# Patient Record
Sex: FEMALE | Race: BLACK OR AFRICAN AMERICAN | Employment: OTHER
[De-identification: names, ages, dates, MRNs, and addresses within clinical notes are randomized per-mention and may not be internally consistent; named-entity substitution may affect disease eponyms.]

---

## 2017-01-23 ENCOUNTER — OFFICE VISIT (OUTPATIENT)
Dept: ORTHOPEDIC SURGERY | Facility: CLINIC | Age: 62
End: 2017-01-23

## 2017-01-23 VITALS — HEIGHT: 58 IN | BODY MASS INDEX: 54.03 KG/M2 | WEIGHT: 257.4 LBS

## 2017-01-23 DIAGNOSIS — M17.0 PRIMARY OSTEOARTHRITIS OF BOTH KNEES: Primary | ICD-10-CM

## 2017-01-23 PROCEDURE — 99213 OFFICE O/P EST LOW 20 MIN: CPT | Performed by: ORTHOPAEDIC SURGERY

## 2017-01-23 RX ORDER — MELOXICAM 15 MG/1
15 TABLET ORAL DAILY
Qty: 30 TABLET | Refills: 3 | Status: SHIPPED | OUTPATIENT
Start: 2017-01-23 | End: 2017-03-06 | Stop reason: SDUPTHER

## 2017-01-23 ASSESSMENT — ENCOUNTER SYMPTOMS
WHEEZING: 0
NAUSEA: 0
CHOKING: 0
EYE DISCHARGE: 0
VOMITING: 0
CHEST TIGHTNESS: 0
DIARRHEA: 0
ABDOMINAL PAIN: 0

## 2017-03-06 ENCOUNTER — HOSPITAL ENCOUNTER (OUTPATIENT)
Age: 62
Setting detail: SPECIMEN
Discharge: HOME | End: 2017-03-06
Payer: MEDICARE

## 2017-03-06 ENCOUNTER — OFFICE VISIT (OUTPATIENT)
Dept: INTERNAL MEDICINE | Facility: CLINIC | Age: 62
End: 2017-03-06

## 2017-03-06 VITALS
HEART RATE: 83 BPM | DIASTOLIC BLOOD PRESSURE: 73 MMHG | SYSTOLIC BLOOD PRESSURE: 127 MMHG | HEIGHT: 58 IN | BODY MASS INDEX: 52.9 KG/M2 | WEIGHT: 252 LBS

## 2017-03-06 DIAGNOSIS — E11.9 TYPE 2 DIABETES MELLITUS WITHOUT COMPLICATION, WITHOUT LONG-TERM CURRENT USE OF INSULIN (HCC): Chronic | ICD-10-CM

## 2017-03-06 DIAGNOSIS — M17.0 PRIMARY OSTEOARTHRITIS OF BOTH KNEES: ICD-10-CM

## 2017-03-06 DIAGNOSIS — E11.9 TYPE 2 DIABETES MELLITUS WITHOUT COMPLICATION, WITHOUT LONG-TERM CURRENT USE OF INSULIN (HCC): Primary | Chronic | ICD-10-CM

## 2017-03-06 DIAGNOSIS — K21.9 GASTROESOPHAGEAL REFLUX DISEASE, ESOPHAGITIS PRESENCE NOT SPECIFIED: ICD-10-CM

## 2017-03-06 DIAGNOSIS — I10 ESSENTIAL HYPERTENSION: ICD-10-CM

## 2017-03-06 DIAGNOSIS — Z11.4 SCREENING FOR HIV (HUMAN IMMUNODEFICIENCY VIRUS): ICD-10-CM

## 2017-03-06 DIAGNOSIS — Z11.59 NEED FOR HEPATITIS C SCREENING TEST: ICD-10-CM

## 2017-03-06 DIAGNOSIS — Z12.31 ENCOUNTER FOR SCREENING MAMMOGRAM FOR MALIGNANT NEOPLASM OF BREAST: ICD-10-CM

## 2017-03-06 LAB
CHOLESTEROL/HDL RATIO: 2.4
CHOLESTEROL: 129 MG/DL
HBA1C MFR BLD: 6.1 %
HDLC SERPL-MCNC: 54 MG/DL
HEPATITIS C ANTIBODY: NONREACTIVE
HIV AG/AB: NONREACTIVE
LDL CHOLESTEROL: 56 MG/DL (ref 0–130)
TRIGL SERPL-MCNC: 93 MG/DL
VLDLC SERPL CALC-MCNC: NORMAL MG/DL (ref 1–30)

## 2017-03-06 PROCEDURE — 83036 HEMOGLOBIN GLYCOSYLATED A1C: CPT | Performed by: INTERNAL MEDICINE

## 2017-03-06 PROCEDURE — 87389 HIV-1 AG W/HIV-1&-2 AB AG IA: CPT

## 2017-03-06 PROCEDURE — 86803 HEPATITIS C AB TEST: CPT

## 2017-03-06 PROCEDURE — 99214 OFFICE O/P EST MOD 30 MIN: CPT | Performed by: INTERNAL MEDICINE

## 2017-03-06 PROCEDURE — 36415 COLL VENOUS BLD VENIPUNCTURE: CPT

## 2017-03-06 PROCEDURE — 80061 LIPID PANEL: CPT

## 2017-03-06 RX ORDER — MELOXICAM 15 MG/1
15 TABLET ORAL DAILY
Qty: 30 TABLET | Refills: 3 | Status: SHIPPED | OUTPATIENT
Start: 2017-03-06 | End: 2017-05-08 | Stop reason: SDUPTHER

## 2017-03-06 RX ORDER — ATORVASTATIN CALCIUM 20 MG/1
TABLET, FILM COATED ORAL
Qty: 90 TABLET | Refills: 5 | Status: SHIPPED | OUTPATIENT
Start: 2017-03-06 | End: 2017-06-07 | Stop reason: SDUPTHER

## 2017-03-06 RX ORDER — LORATADINE 10 MG/1
10 TABLET ORAL DAILY
Qty: 90 TABLET | Refills: 3 | Status: SHIPPED | OUTPATIENT
Start: 2017-03-06 | End: 2017-06-07 | Stop reason: SDUPTHER

## 2017-03-06 RX ORDER — LISINOPRIL 30 MG/1
30 TABLET ORAL DAILY
Qty: 90 TABLET | Refills: 5 | Status: SHIPPED | OUTPATIENT
Start: 2017-03-06 | End: 2017-06-07 | Stop reason: SDUPTHER

## 2017-03-06 RX ORDER — OMEPRAZOLE 20 MG/1
20 CAPSULE, DELAYED RELEASE ORAL DAILY
Qty: 90 CAPSULE | Refills: 3 | Status: SHIPPED | OUTPATIENT
Start: 2017-03-06 | End: 2017-06-07 | Stop reason: SDUPTHER

## 2017-03-08 ENCOUNTER — OFFICE VISIT (OUTPATIENT)
Dept: PODIATRY | Facility: CLINIC | Age: 62
End: 2017-03-08

## 2017-03-08 VITALS
BODY MASS INDEX: 53.74 KG/M2 | DIASTOLIC BLOOD PRESSURE: 79 MMHG | HEIGHT: 58 IN | WEIGHT: 256 LBS | SYSTOLIC BLOOD PRESSURE: 149 MMHG | TEMPERATURE: 98.2 F | HEART RATE: 102 BPM

## 2017-03-08 DIAGNOSIS — B35.1 ONYCHOMYCOSIS: Primary | ICD-10-CM

## 2017-03-08 DIAGNOSIS — R60.0 BILATERAL EDEMA OF LOWER EXTREMITY: ICD-10-CM

## 2017-03-08 DIAGNOSIS — E11.42 CONTROLLED TYPE 2 DIABETES MELLITUS WITH DIABETIC POLYNEUROPATHY, WITHOUT LONG-TERM CURRENT USE OF INSULIN (HCC): ICD-10-CM

## 2017-03-08 DIAGNOSIS — M79.675 PAIN OF TOES OF BOTH FEET: ICD-10-CM

## 2017-03-08 DIAGNOSIS — M79.674 PAIN OF TOES OF BOTH FEET: ICD-10-CM

## 2017-03-08 PROCEDURE — 11721 DEBRIDE NAIL 6 OR MORE: CPT | Performed by: PODIATRIST

## 2017-04-17 DIAGNOSIS — M17.0 OSTEOARTHRITIS OF BOTH KNEES, UNSPECIFIED OSTEOARTHRITIS TYPE: ICD-10-CM

## 2017-04-17 RX ORDER — TRAMADOL HYDROCHLORIDE 50 MG/1
50 TABLET ORAL 2 TIMES DAILY PRN
Qty: 60 TABLET | Refills: 0 | OUTPATIENT
Start: 2017-04-17 | End: 2017-06-07 | Stop reason: SDUPTHER

## 2017-05-08 ENCOUNTER — OFFICE VISIT (OUTPATIENT)
Dept: ORTHOPEDIC SURGERY | Age: 62
End: 2017-05-08
Payer: MEDICARE

## 2017-05-08 VITALS — HEIGHT: 58 IN | WEIGHT: 254 LBS | BODY MASS INDEX: 53.32 KG/M2

## 2017-05-08 DIAGNOSIS — M17.0 PRIMARY OSTEOARTHRITIS OF BOTH KNEES: Primary | ICD-10-CM

## 2017-05-08 PROCEDURE — 99213 OFFICE O/P EST LOW 20 MIN: CPT | Performed by: ORTHOPAEDIC SURGERY

## 2017-05-08 RX ORDER — MELOXICAM 15 MG/1
15 TABLET ORAL DAILY
Qty: 30 TABLET | Refills: 3 | Status: SHIPPED | OUTPATIENT
Start: 2017-05-08 | End: 2017-06-07 | Stop reason: SDUPTHER

## 2017-05-08 ASSESSMENT — ENCOUNTER SYMPTOMS
DIARRHEA: 0
VOMITING: 0
CHOKING: 0
NAUSEA: 0
WHEEZING: 0
EYE DISCHARGE: 0
CHEST TIGHTNESS: 0
ABDOMINAL PAIN: 0

## 2017-05-30 ENCOUNTER — HOSPITAL ENCOUNTER (OUTPATIENT)
Dept: PHYSICAL THERAPY | Facility: CLINIC | Age: 62
Setting detail: THERAPIES SERIES
Discharge: HOME | End: 2017-05-30
Payer: MEDICARE

## 2017-05-30 PROCEDURE — G8978 MOBILITY CURRENT STATUS: HCPCS

## 2017-05-30 PROCEDURE — G8979 MOBILITY GOAL STATUS: HCPCS

## 2017-05-30 PROCEDURE — 97110 THERAPEUTIC EXERCISES: CPT

## 2017-05-30 PROCEDURE — 97161 PT EVAL LOW COMPLEX 20 MIN: CPT

## 2017-06-01 ENCOUNTER — HOSPITAL ENCOUNTER (OUTPATIENT)
Dept: PHYSICAL THERAPY | Facility: CLINIC | Age: 62
Setting detail: THERAPIES SERIES
Discharge: HOME | End: 2017-06-01
Payer: MEDICARE

## 2017-06-06 ENCOUNTER — HOSPITAL ENCOUNTER (OUTPATIENT)
Dept: PHYSICAL THERAPY | Facility: CLINIC | Age: 62
Setting detail: THERAPIES SERIES
Discharge: HOME | End: 2017-06-06
Payer: MEDICARE

## 2017-06-06 PROCEDURE — 97113 AQUATIC THERAPY/EXERCISES: CPT

## 2017-06-07 ENCOUNTER — OFFICE VISIT (OUTPATIENT)
Dept: INTERNAL MEDICINE | Age: 62
End: 2017-06-07
Payer: MEDICARE

## 2017-06-07 VITALS
HEART RATE: 76 BPM | HEIGHT: 58 IN | DIASTOLIC BLOOD PRESSURE: 79 MMHG | WEIGHT: 254 LBS | BODY MASS INDEX: 53.32 KG/M2 | SYSTOLIC BLOOD PRESSURE: 133 MMHG

## 2017-06-07 DIAGNOSIS — L85.3 XEROSIS OF SKIN: ICD-10-CM

## 2017-06-07 DIAGNOSIS — M17.0 OSTEOARTHRITIS OF BOTH KNEES, UNSPECIFIED OSTEOARTHRITIS TYPE: ICD-10-CM

## 2017-06-07 DIAGNOSIS — M17.0 PRIMARY OSTEOARTHRITIS OF BOTH KNEES: ICD-10-CM

## 2017-06-07 DIAGNOSIS — I10 ESSENTIAL HYPERTENSION: ICD-10-CM

## 2017-06-07 DIAGNOSIS — K21.9 GASTROESOPHAGEAL REFLUX DISEASE, ESOPHAGITIS PRESENCE NOT SPECIFIED: ICD-10-CM

## 2017-06-07 PROCEDURE — 99213 OFFICE O/P EST LOW 20 MIN: CPT | Performed by: INTERNAL MEDICINE

## 2017-06-07 PROCEDURE — 96160 PT-FOCUSED HLTH RISK ASSMT: CPT | Performed by: INTERNAL MEDICINE

## 2017-06-07 RX ORDER — LORATADINE 10 MG/1
10 TABLET ORAL DAILY
Qty: 90 TABLET | Refills: 3 | Status: SHIPPED | OUTPATIENT
Start: 2017-06-07 | End: 2017-10-11 | Stop reason: SDUPTHER

## 2017-06-07 RX ORDER — FLUTICASONE PROPIONATE 50 MCG
1 SPRAY, SUSPENSION (ML) NASAL DAILY
Qty: 3 BOTTLE | Refills: 3 | Status: SHIPPED | OUTPATIENT
Start: 2017-06-07 | End: 2017-10-11 | Stop reason: SDUPTHER

## 2017-06-07 RX ORDER — EMOLLIENT COMBINATION NO.110
1 LOTION (ML) TOPICAL DAILY
Qty: 1 BOTTLE | Refills: 3 | Status: SHIPPED | OUTPATIENT
Start: 2017-06-07 | End: 2017-10-11 | Stop reason: SDUPTHER

## 2017-06-07 RX ORDER — LISINOPRIL 30 MG/1
30 TABLET ORAL DAILY
Qty: 90 TABLET | Refills: 5 | Status: SHIPPED | OUTPATIENT
Start: 2017-06-07 | End: 2017-10-11 | Stop reason: SDUPTHER

## 2017-06-07 RX ORDER — TRAMADOL HYDROCHLORIDE 50 MG/1
50 TABLET ORAL 2 TIMES DAILY PRN
Qty: 60 TABLET | Refills: 0 | Status: SHIPPED | OUTPATIENT
Start: 2017-06-07 | End: 2017-08-17 | Stop reason: SDUPTHER

## 2017-06-07 RX ORDER — OMEPRAZOLE 20 MG/1
20 CAPSULE, DELAYED RELEASE ORAL DAILY
Qty: 90 CAPSULE | Refills: 3 | Status: SHIPPED | OUTPATIENT
Start: 2017-06-07 | End: 2017-10-11 | Stop reason: SDUPTHER

## 2017-06-07 RX ORDER — MELOXICAM 15 MG/1
15 TABLET ORAL DAILY
Qty: 30 TABLET | Refills: 3 | Status: SHIPPED | OUTPATIENT
Start: 2017-06-07 | End: 2017-08-07 | Stop reason: SDUPTHER

## 2017-06-07 RX ORDER — CLOBETASOL PROPIONATE 0.5 MG/G
OINTMENT TOPICAL
Qty: 1 TUBE | Refills: 3 | Status: SHIPPED | OUTPATIENT
Start: 2017-06-07 | End: 2017-10-11

## 2017-06-07 RX ORDER — ATORVASTATIN CALCIUM 20 MG/1
TABLET, FILM COATED ORAL
Qty: 90 TABLET | Refills: 5 | Status: SHIPPED | OUTPATIENT
Start: 2017-06-07 | End: 2018-08-31 | Stop reason: SDUPTHER

## 2017-06-07 ASSESSMENT — PATIENT HEALTH QUESTIONNAIRE - PHQ9
3. TROUBLE FALLING OR STAYING ASLEEP: 1
6. FEELING BAD ABOUT YOURSELF - OR THAT YOU ARE A FAILURE OR HAVE LET YOURSELF OR YOUR FAMILY DOWN: 0
9. THOUGHTS THAT YOU WOULD BE BETTER OFF DEAD, OR OF HURTING YOURSELF: 0
7. TROUBLE CONCENTRATING ON THINGS, SUCH AS READING THE NEWSPAPER OR WATCHING TELEVISION: 0
SUM OF ALL RESPONSES TO PHQ QUESTIONS 1-9: 2
10. IF YOU CHECKED OFF ANY PROBLEMS, HOW DIFFICULT HAVE THESE PROBLEMS MADE IT FOR YOU TO DO YOUR WORK, TAKE CARE OF THINGS AT HOME, OR GET ALONG WITH OTHER PEOPLE: 0
4. FEELING TIRED OR HAVING LITTLE ENERGY: 1
SUM OF ALL RESPONSES TO PHQ9 QUESTIONS 1 & 2: 0
8. MOVING OR SPEAKING SO SLOWLY THAT OTHER PEOPLE COULD HAVE NOTICED. OR THE OPPOSITE, BEING SO FIGETY OR RESTLESS THAT YOU HAVE BEEN MOVING AROUND A LOT MORE THAN USUAL: 0
1. LITTLE INTEREST OR PLEASURE IN DOING THINGS: 0
5. POOR APPETITE OR OVEREATING: 0
2. FEELING DOWN, DEPRESSED OR HOPELESS: 0

## 2017-06-08 ENCOUNTER — HOSPITAL ENCOUNTER (OUTPATIENT)
Dept: PHYSICAL THERAPY | Facility: CLINIC | Age: 62
Setting detail: THERAPIES SERIES
Discharge: HOME | End: 2017-06-08
Payer: MEDICARE

## 2017-06-08 PROCEDURE — 97113 AQUATIC THERAPY/EXERCISES: CPT

## 2017-06-13 ENCOUNTER — HOSPITAL ENCOUNTER (OUTPATIENT)
Dept: PHYSICAL THERAPY | Facility: CLINIC | Age: 62
Setting detail: THERAPIES SERIES
Discharge: HOME | End: 2017-06-13
Payer: MEDICARE

## 2017-06-13 PROCEDURE — 97113 AQUATIC THERAPY/EXERCISES: CPT

## 2017-06-14 ENCOUNTER — PROCEDURE VISIT (OUTPATIENT)
Dept: PODIATRY | Age: 62
End: 2017-06-14
Payer: MEDICARE

## 2017-06-14 ENCOUNTER — HOSPITAL ENCOUNTER (OUTPATIENT)
Age: 62
Discharge: HOME | End: 2017-06-14
Payer: MEDICARE

## 2017-06-14 ENCOUNTER — HOSPITAL ENCOUNTER (OUTPATIENT)
Dept: GENERAL RADIOLOGY | Age: 62
Discharge: HOME | End: 2017-06-14
Payer: MEDICARE

## 2017-06-14 VITALS
WEIGHT: 253 LBS | SYSTOLIC BLOOD PRESSURE: 115 MMHG | HEART RATE: 76 BPM | HEIGHT: 58 IN | DIASTOLIC BLOOD PRESSURE: 87 MMHG | TEMPERATURE: 98.1 F | BODY MASS INDEX: 53.11 KG/M2

## 2017-06-14 DIAGNOSIS — B35.3 TINEA PEDIS OF BOTH FEET: ICD-10-CM

## 2017-06-14 DIAGNOSIS — B35.1 ONYCHOMYCOSIS: Primary | ICD-10-CM

## 2017-06-14 DIAGNOSIS — E11.42 CONTROLLED TYPE 2 DIABETES MELLITUS WITH DIABETIC POLYNEUROPATHY, WITHOUT LONG-TERM CURRENT USE OF INSULIN (HCC): ICD-10-CM

## 2017-06-14 DIAGNOSIS — M79.674 PAIN OF TOES OF BOTH FEET: ICD-10-CM

## 2017-06-14 DIAGNOSIS — R60.0 BILATERAL EDEMA OF LOWER EXTREMITY: ICD-10-CM

## 2017-06-14 DIAGNOSIS — M25.572 SINUS TARSI SYNDROME, LEFT: ICD-10-CM

## 2017-06-14 DIAGNOSIS — M79.675 PAIN OF TOES OF BOTH FEET: ICD-10-CM

## 2017-06-14 PROCEDURE — 73610 X-RAY EXAM OF ANKLE: CPT

## 2017-06-14 PROCEDURE — 99213 OFFICE O/P EST LOW 20 MIN: CPT | Performed by: PODIATRIST

## 2017-06-14 PROCEDURE — 11721 DEBRIDE NAIL 6 OR MORE: CPT | Performed by: PODIATRIST

## 2017-06-14 RX ORDER — CLOTRIMAZOLE AND BETAMETHASONE DIPROPIONATE 10; .64 MG/G; MG/G
CREAM TOPICAL
Qty: 1 TUBE | Refills: 3 | Status: SHIPPED | OUTPATIENT
Start: 2017-06-14 | End: 2018-12-13 | Stop reason: SDUPTHER

## 2017-06-14 RX ORDER — CLOTRIMAZOLE AND BETAMETHASONE DIPROPIONATE 10; .64 MG/G; MG/G
CREAM TOPICAL
Qty: 1 TUBE | Refills: 3 | Status: SHIPPED | OUTPATIENT
Start: 2017-06-14 | End: 2017-06-14 | Stop reason: CLARIF

## 2017-06-15 ENCOUNTER — HOSPITAL ENCOUNTER (OUTPATIENT)
Dept: PHYSICAL THERAPY | Facility: CLINIC | Age: 62
Setting detail: THERAPIES SERIES
Discharge: HOME | End: 2017-06-15
Payer: MEDICARE

## 2017-06-15 PROCEDURE — 97113 AQUATIC THERAPY/EXERCISES: CPT

## 2017-06-20 ENCOUNTER — HOSPITAL ENCOUNTER (OUTPATIENT)
Dept: PHYSICAL THERAPY | Facility: CLINIC | Age: 62
Setting detail: THERAPIES SERIES
Discharge: HOME | End: 2017-06-20
Payer: MEDICARE

## 2017-06-20 ENCOUNTER — TELEPHONE (OUTPATIENT)
Dept: INTERNAL MEDICINE | Age: 62
End: 2017-06-20

## 2017-06-22 ENCOUNTER — HOSPITAL ENCOUNTER (OUTPATIENT)
Dept: PHYSICAL THERAPY | Facility: CLINIC | Age: 62
Setting detail: THERAPIES SERIES
Discharge: HOME | End: 2017-06-22
Payer: MEDICARE

## 2017-06-22 PROCEDURE — 97113 AQUATIC THERAPY/EXERCISES: CPT

## 2017-06-27 ENCOUNTER — HOSPITAL ENCOUNTER (OUTPATIENT)
Dept: PHYSICAL THERAPY | Facility: CLINIC | Age: 62
Setting detail: THERAPIES SERIES
Discharge: HOME | End: 2017-06-27
Payer: MEDICARE

## 2017-06-27 PROCEDURE — 97113 AQUATIC THERAPY/EXERCISES: CPT

## 2017-06-29 ENCOUNTER — HOSPITAL ENCOUNTER (OUTPATIENT)
Dept: PHYSICAL THERAPY | Facility: CLINIC | Age: 62
Setting detail: THERAPIES SERIES
Discharge: HOME | End: 2017-06-29
Payer: MEDICARE

## 2017-06-29 DIAGNOSIS — Z12.11 SCREENING FOR COLORECTAL CANCER: Primary | ICD-10-CM

## 2017-06-29 DIAGNOSIS — Z12.12 SCREENING FOR COLORECTAL CANCER: Primary | ICD-10-CM

## 2017-06-29 LAB
CONTROL: NORMAL
HEMOCCULT STL QL: NEGATIVE

## 2017-06-29 PROCEDURE — 82274 ASSAY TEST FOR BLOOD FECAL: CPT | Performed by: INTERNAL MEDICINE

## 2017-08-07 ENCOUNTER — OFFICE VISIT (OUTPATIENT)
Dept: ORTHOPEDIC SURGERY | Age: 62
End: 2017-08-07
Payer: MEDICARE

## 2017-08-07 VITALS — HEIGHT: 59 IN | WEIGHT: 257.8 LBS | BODY MASS INDEX: 51.97 KG/M2

## 2017-08-07 DIAGNOSIS — M17.11 ARTHRITIS OF RIGHT KNEE: Primary | ICD-10-CM

## 2017-08-07 DIAGNOSIS — M17.12 ARTHRITIS OF LEFT KNEE: ICD-10-CM

## 2017-08-07 DIAGNOSIS — M17.0 PRIMARY OSTEOARTHRITIS OF BOTH KNEES: ICD-10-CM

## 2017-08-07 PROCEDURE — 99213 OFFICE O/P EST LOW 20 MIN: CPT | Performed by: ORTHOPAEDIC SURGERY

## 2017-08-07 RX ORDER — MELOXICAM 15 MG/1
15 TABLET ORAL DAILY
Qty: 30 TABLET | Refills: 3 | Status: SHIPPED | OUTPATIENT
Start: 2017-08-07 | End: 2017-10-24 | Stop reason: SDUPTHER

## 2017-08-17 DIAGNOSIS — M17.0 OSTEOARTHRITIS OF BOTH KNEES, UNSPECIFIED OSTEOARTHRITIS TYPE: ICD-10-CM

## 2017-08-17 RX ORDER — TRAMADOL HYDROCHLORIDE 50 MG/1
50 TABLET ORAL 2 TIMES DAILY PRN
Qty: 60 TABLET | Refills: 0 | Status: SHIPPED | OUTPATIENT
Start: 2017-08-17 | End: 2017-10-11 | Stop reason: SDUPTHER

## 2017-08-23 ENCOUNTER — OFFICE VISIT (OUTPATIENT)
Dept: PODIATRY | Age: 62
End: 2017-08-23
Payer: MEDICARE

## 2017-08-23 VITALS
BODY MASS INDEX: 52.41 KG/M2 | WEIGHT: 260 LBS | TEMPERATURE: 97.7 F | HEIGHT: 59 IN | HEART RATE: 90 BPM | SYSTOLIC BLOOD PRESSURE: 130 MMHG | DIASTOLIC BLOOD PRESSURE: 65 MMHG

## 2017-08-23 DIAGNOSIS — R60.0 BILATERAL EDEMA OF LOWER EXTREMITY: ICD-10-CM

## 2017-08-23 DIAGNOSIS — M25.572 SINUS TARSI SYNDROME, LEFT: ICD-10-CM

## 2017-08-23 DIAGNOSIS — M19.079 ARTHRITIS OF ANKLE: Primary | ICD-10-CM

## 2017-08-23 PROCEDURE — 99213 OFFICE O/P EST LOW 20 MIN: CPT | Performed by: PODIATRIST

## 2017-10-05 ENCOUNTER — HOSPITAL ENCOUNTER (OUTPATIENT)
Dept: PHYSICAL THERAPY | Facility: CLINIC | Age: 62
Setting detail: THERAPIES SERIES
Discharge: HOME | End: 2017-10-05
Payer: MEDICARE

## 2017-10-11 ENCOUNTER — OFFICE VISIT (OUTPATIENT)
Dept: INTERNAL MEDICINE | Age: 62
End: 2017-10-11
Payer: MEDICARE

## 2017-10-11 VITALS
HEIGHT: 58 IN | WEIGHT: 265.4 LBS | DIASTOLIC BLOOD PRESSURE: 87 MMHG | BODY MASS INDEX: 55.71 KG/M2 | SYSTOLIC BLOOD PRESSURE: 154 MMHG | HEART RATE: 71 BPM

## 2017-10-11 DIAGNOSIS — Z23 NEEDS FLU SHOT: ICD-10-CM

## 2017-10-11 DIAGNOSIS — K21.9 GASTROESOPHAGEAL REFLUX DISEASE, ESOPHAGITIS PRESENCE NOT SPECIFIED: ICD-10-CM

## 2017-10-11 DIAGNOSIS — E11.9 TYPE 2 DIABETES MELLITUS WITHOUT COMPLICATION, WITHOUT LONG-TERM CURRENT USE OF INSULIN (HCC): Primary | Chronic | ICD-10-CM

## 2017-10-11 DIAGNOSIS — I10 ESSENTIAL HYPERTENSION: ICD-10-CM

## 2017-10-11 DIAGNOSIS — M17.0 OSTEOARTHRITIS OF BOTH KNEES, UNSPECIFIED OSTEOARTHRITIS TYPE: ICD-10-CM

## 2017-10-11 LAB — HBA1C MFR BLD: 6.3 %

## 2017-10-11 PROCEDURE — G0008 ADMIN INFLUENZA VIRUS VAC: HCPCS | Performed by: INTERNAL MEDICINE

## 2017-10-11 PROCEDURE — 90688 IIV4 VACCINE SPLT 0.5 ML IM: CPT | Performed by: INTERNAL MEDICINE

## 2017-10-11 PROCEDURE — 99214 OFFICE O/P EST MOD 30 MIN: CPT | Performed by: INTERNAL MEDICINE

## 2017-10-11 PROCEDURE — 83036 HEMOGLOBIN GLYCOSYLATED A1C: CPT | Performed by: INTERNAL MEDICINE

## 2017-10-11 RX ORDER — LORATADINE 10 MG/1
10 TABLET ORAL DAILY
Qty: 90 TABLET | Refills: 3 | Status: SHIPPED | OUTPATIENT
Start: 2017-10-11 | End: 2018-12-13 | Stop reason: SDUPTHER

## 2017-10-11 RX ORDER — GUAIFENESIN 100 MG/5ML
10 SYRUP ORAL 2 TIMES DAILY PRN
Qty: 1 BOTTLE | Refills: 3 | Status: SHIPPED | OUTPATIENT
Start: 2017-10-11 | End: 2019-05-23 | Stop reason: ALTCHOICE

## 2017-10-11 RX ORDER — BLOOD SUGAR DIAGNOSTIC
STRIP MISCELLANEOUS
Qty: 100 EACH | Refills: 11 | Status: SHIPPED | OUTPATIENT
Start: 2017-10-11

## 2017-10-11 RX ORDER — FLUTICASONE PROPIONATE 50 MCG
1 SPRAY, SUSPENSION (ML) NASAL DAILY
Qty: 3 BOTTLE | Refills: 3 | Status: SHIPPED | OUTPATIENT
Start: 2017-10-11 | End: 2018-12-13 | Stop reason: SDUPTHER

## 2017-10-11 RX ORDER — LISINOPRIL 30 MG/1
30 TABLET ORAL DAILY
Qty: 90 TABLET | Refills: 5 | Status: SHIPPED | OUTPATIENT
Start: 2017-10-11 | End: 2018-12-13 | Stop reason: SDUPTHER

## 2017-10-11 RX ORDER — GLUCOSAMINE HCL/CHONDROITIN SU 500-400 MG
CAPSULE ORAL
Qty: 100 STRIP | Refills: 11 | Status: SHIPPED | OUTPATIENT
Start: 2017-10-11 | End: 2019-05-23 | Stop reason: ALTCHOICE

## 2017-10-11 RX ORDER — TRAMADOL HYDROCHLORIDE 50 MG/1
50 TABLET ORAL 2 TIMES DAILY PRN
Qty: 60 TABLET | Refills: 0 | Status: SHIPPED | OUTPATIENT
Start: 2017-10-11 | End: 2017-12-01 | Stop reason: SDUPTHER

## 2017-10-11 RX ORDER — EMOLLIENT COMBINATION NO.110
1 LOTION (ML) TOPICAL DAILY
Qty: 1 BOTTLE | Refills: 3 | Status: SHIPPED | OUTPATIENT
Start: 2017-10-11 | End: 2019-05-23 | Stop reason: ALTCHOICE

## 2017-10-11 RX ORDER — LANCETS 30 GAUGE
EACH MISCELLANEOUS
Qty: 100 EACH | Refills: 3 | Status: SHIPPED | OUTPATIENT
Start: 2017-10-11

## 2017-10-11 RX ORDER — OMEPRAZOLE 20 MG/1
20 CAPSULE, DELAYED RELEASE ORAL DAILY
Qty: 90 CAPSULE | Refills: 3 | Status: SHIPPED | OUTPATIENT
Start: 2017-10-11 | End: 2018-12-13 | Stop reason: SDUPTHER

## 2017-10-11 NOTE — PROGRESS NOTES
Subjective:      Patient ID: Ramona Burris is a 60 y.o. female. HPI Pt here for annual physical    1. htn-  on lisinopril 30 mg.  BP stable   2. DM -well controlled on metformin. A1C -6.3 today -on Metformin. Follows up with podiatry. 3. OA, c/o knee pain- pain stable on mobic , tramadol. Xray-severe DJD- Following up with ortho. finished Aquatherapy  , ortho has ordered it again  4. C/o urinary incontinence for last few months, every time she coughs or sneezes. Was given kegel exercises in the past those have been helping. She does not want any medications  5. GERD-stable symptoms on Prilosec  6. Only new complaint today is swelling in both lower extremities, chronic, uses compression stockings. No erythema or pain. No history of congestive heart failure. She denies AAMIR symptoms-such as snoring, apnea episodes, unrefreshed sleep, daytime somnolence    Review of Systems   Respiratory: Negative for cough, shortness of breath, wheezing and stridor. Cardiovascular: Negative for chest pain, palpitations and leg swelling. Gastrointestinal: Negative for nausea, vomiting, abdominal pain, diarrhea and constipation. Objective:   Physical Exam   Constitutional: She is oriented to person, place, and time. She appears well-developed. No distress. Cardiovascular: Normal rate and regular rhythm. Pulmonary/Chest: Effort normal and breath sounds normal. No stridor. No respiratory distress. no wheezes. no rales. Abdominal: Soft. Bowel sounds are normal.  no distension. There is no tenderness. There is no rebound and no guarding. Musculoskeletal: bilateral knees-decreased ROM  Minimal ankle swelling present        Assessment:     1. Primary osteoarthritis of both knees    2. Essential hypertension    3. Gastroesophageal reflux disease, esophagitis presence not specified    4. Bilateral LE edema   5. Needs flu shot  6. DM-A1C-6.3   Plan:   1. Tramadol PRN. mobic PRN.  Continue follow up with

## 2017-10-11 NOTE — PATIENT INSTRUCTIONS
Medications e-scribed to pharmacy of patient's choice. Printed Script for Tramadol given to patient. Return To Clinic 1/19/18. After Visit Summary given and reviewed. JF    It is very important for your care that you keep your appointment. If for some reason you are unable to keep your appointment it is equally important that you call our office at [phone] to cancel your appointment and reschedule. Failure to do so may result in your termination from our practice.

## 2017-10-11 NOTE — PROGRESS NOTES
Visit Information    Have you changed or started any medications since your last visit including any over-the-counter medicines, vitamins, or herbal medicines? yes - pain patches,    Have you stopped taking any of your medications? Is so, why? -  no  Are you having any side effects from any of your medications? - no    Have you seen any other physician or provider since your last visit?  no   Have you had any other diagnostic tests since your last visit?  no   Have you been seen in the emergency room and/or had an admission in a hospital since we last saw you?  no   Have you had your routine dental cleaning in the past 6 months?  no     Do you have an active [employer] account? If no, what is the barrier?   Yes    Patient Care Team:  Mikayla Man MD as PCP - General    Medical History Review  Past Medical, Family, and Social History reviewed and does contribute to the patient presenting condition    Health Maintenance   Topic Date Due    Diabetic foot exam  03/23/2017    Diabetic retinal exam  08/20/2017    Flu vaccine (1) 09/01/2017    Diabetic microalbuminuria test  11/16/2017    Breast cancer screen  01/18/2018    Diabetic hemoglobin A1C test  03/06/2018    Lipid screen  03/06/2018    Colon Cancer Screen FIT/FOBT  06/29/2018    DTaP/Tdap/Td vaccine (2 - Td) 07/18/2026    Zostavax vaccine  Addressed    Pneumococcal med risk  Completed    Hepatitis C screen  Completed    HIV screen  Completed

## 2017-10-24 DIAGNOSIS — M17.0 PRIMARY OSTEOARTHRITIS OF BOTH KNEES: ICD-10-CM

## 2017-10-24 RX ORDER — MELOXICAM 15 MG/1
15 TABLET ORAL DAILY
Qty: 30 TABLET | Refills: 3 | Status: SHIPPED | OUTPATIENT
Start: 2017-10-24 | End: 2018-01-19 | Stop reason: SDUPTHER

## 2017-10-24 NOTE — TELEPHONE ENCOUNTER
Health Maintenance   Topic Date Due    Diabetic foot exam  03/23/2017    Diabetic retinal exam  08/20/2017    Diabetic microalbuminuria test  11/16/2017    Breast cancer screen  01/18/2018    Lipid screen  03/06/2018    Colon Cancer Screen FIT/FOBT  06/29/2018    Diabetic hemoglobin A1C test  10/11/2018    DTaP/Tdap/Td vaccine (2 - Td) 07/18/2026    Zostavax vaccine  Addressed    Flu vaccine  Completed    Pneumococcal med risk  Completed    Hepatitis C screen  Completed    HIV screen  Completed             (applicable per patient's age: Cancer Screenings, Depression Screening, Fall Risk Screening, Immunizations)    Hemoglobin A1C (%)   Date Value   10/11/2017 6.3   03/06/2017 6.1   07/18/2016 6.3     Microalb/Crt.  Ratio (mcg/mg creat)   Date Value   11/16/2016 10     LDL Cholesterol (mg/dL)   Date Value   03/06/2017 56     AST (U/L)   Date Value   09/03/2015 15     ALT (U/L)   Date Value   09/03/2015 17     BUN (mg/dL)   Date Value   11/16/2016 11      (goal A1C is < 7)   (goal LDL is <100) need 30-50% reduction from baseline     BP Readings from Last 3 Encounters:   10/11/17 (!) 154/87   08/23/17 130/65   06/14/17 115/87    (goal /80)      All Future Testing planned in [facility]:  Lab Frequency Next Occurrence   ANN Digital Screen Bilateral Once 10/11/2017       Next Visit Date:  Future Appointments  Date Time Provider Jerry Clark   1/19/2018 1:00 PM Aracely Hoang MD [facility] IM [address] [facility]   8/8/2018 2:10 PM SCHEDULE, [facility] [address] [place] [address] Rd            Patient Active Problem List:     Osteoarthritis     Eczema     Obesity     GERD (gastroesophageal reflux disease)     Hypertriglyceridemia     HTN (hypertension)     Hypopigmented skin lesion     Asthma     Allergic rhinitis     Urine, incontinence, stress female     Diabetes mellitus (HCC)     Glaucoma     Primary osteoarthritis of both knees

## 2017-12-01 DIAGNOSIS — M17.0 OSTEOARTHRITIS OF BOTH KNEES, UNSPECIFIED OSTEOARTHRITIS TYPE: ICD-10-CM

## 2017-12-01 RX ORDER — TRAMADOL HYDROCHLORIDE 50 MG/1
TABLET ORAL
Qty: 60 TABLET | Refills: 0 | OUTPATIENT
Start: 2017-12-01 | End: 2018-01-19 | Stop reason: SDUPTHER

## 2018-01-17 ENCOUNTER — OFFICE VISIT (OUTPATIENT)
Dept: PODIATRY | Age: 63
End: 2018-01-17
Payer: MEDICARE

## 2018-01-17 VITALS
DIASTOLIC BLOOD PRESSURE: 81 MMHG | HEART RATE: 83 BPM | HEIGHT: 58 IN | TEMPERATURE: 97.9 F | WEIGHT: 264 LBS | SYSTOLIC BLOOD PRESSURE: 135 MMHG | BODY MASS INDEX: 55.42 KG/M2

## 2018-01-17 DIAGNOSIS — M25.472 SWELLING OF ANKLE, LEFT: ICD-10-CM

## 2018-01-17 DIAGNOSIS — B35.1 ONYCHOMYCOSIS: ICD-10-CM

## 2018-01-17 DIAGNOSIS — M19.079 ARTHRITIS OF ANKLE: Primary | ICD-10-CM

## 2018-01-17 DIAGNOSIS — M79.89 FOOT SWELLING: ICD-10-CM

## 2018-01-17 DIAGNOSIS — E11.42 CONTROLLED TYPE 2 DIABETES MELLITUS WITH DIABETIC POLYNEUROPATHY, WITHOUT LONG-TERM CURRENT USE OF INSULIN (HCC): ICD-10-CM

## 2018-01-17 PROCEDURE — 11720 DEBRIDE NAIL 1-5: CPT | Performed by: STUDENT IN AN ORGANIZED HEALTH CARE EDUCATION/TRAINING PROGRAM

## 2018-01-17 PROCEDURE — 99213 OFFICE O/P EST LOW 20 MIN: CPT | Performed by: STUDENT IN AN ORGANIZED HEALTH CARE EDUCATION/TRAINING PROGRAM

## 2018-01-17 RX ORDER — AMMONIUM LACTATE 12 G/100G
LOTION TOPICAL
Qty: 1 BOTTLE | Refills: 2 | Status: SHIPPED | OUTPATIENT
Start: 2018-01-17 | End: 2020-07-01 | Stop reason: SDUPTHER

## 2018-01-17 NOTE — PROGRESS NOTES
Subjective:      Cara Blackmon is a 60 y.o. female who presents to the clinic for a diabetic foot checkup. She was dispensed a swedo brace to the left ankle. She reports 70% improvement of her left ankle pain. She reports to have fallen on 12/24/17 on her left side. The swelling to her left ankle has increased, but she denies any pain. She recently trimmed one of her toenails and reports it was bleeding afterwards. No f/c/v/d/c.     Objective:   Physical Exam   Vitals:    01/17/18 1352   BP: 135/81   Pulse:    Temp:        Gen: AAOx3, NAD     Vascular: DP and PT pulses are palpable, bilateral. CFT is brisk to all digits. Skin temp is warm to warm proximal to distal, bilateral. Moderate pitting edema noted to dorsum of foot and silvino-malleolar, left. No varicosities noted, bilateral.     Neurologic: Sharp/dull discrimination intact, bilateral. Protective sensation intact to 10 /10 pedal sites as tested with [place]-[employer] Monofilament 5.07g.      Dermatologic: Bilateral hallux nails are thickened, and elongated. Nails 2-5 are short, discolored. Webspaces 1-4 are c/d/i, bilateral. No open lesions noted. xerotic skin resolved bilateral plantar feet. Excoriation to dorsal aspect of left 2nd digit.     Musculoskeletal: Muscle strength 5/5 for all LE muscle groups. Brachymetatarsal noted to right 4th. No POP to left STJ, medial, and lateral ankle gutter.        Assessment:      1. Arthritis of ankle     2. Controlled type 2 diabetes mellitus with diabetic polyneuropathy, without long-term current use of insulin ([address]     3. Swelling of ankle, left     4. Foot swelling         Plan:      Pt seen and evaluated. Educated patient on proper diabetic foot care. Debrided bilateral hallux nails without incident and to patient satisfaction. Educated patient that she needs to be in more supportive shoe gear.    Encouraged patient to continue wearing compression socks bilateral.   Continue wearing compression

## 2018-01-19 ENCOUNTER — OFFICE VISIT (OUTPATIENT)
Dept: INTERNAL MEDICINE | Age: 63
End: 2018-01-19
Payer: MEDICARE

## 2018-01-19 VITALS
WEIGHT: 268 LBS | HEART RATE: 87 BPM | DIASTOLIC BLOOD PRESSURE: 78 MMHG | SYSTOLIC BLOOD PRESSURE: 136 MMHG | BODY MASS INDEX: 56.25 KG/M2 | HEIGHT: 58 IN | OXYGEN SATURATION: 100 %

## 2018-01-19 DIAGNOSIS — M17.0 PRIMARY OSTEOARTHRITIS OF BOTH KNEES: ICD-10-CM

## 2018-01-19 DIAGNOSIS — E11.9 TYPE 2 DIABETES MELLITUS WITHOUT COMPLICATION, WITHOUT LONG-TERM CURRENT USE OF INSULIN (HCC): Chronic | ICD-10-CM

## 2018-01-19 DIAGNOSIS — R06.02 SHORTNESS OF BREATH: ICD-10-CM

## 2018-01-19 DIAGNOSIS — I10 ESSENTIAL HYPERTENSION: ICD-10-CM

## 2018-01-19 DIAGNOSIS — M79.89 LEG SWELLING: Primary | ICD-10-CM

## 2018-01-19 DIAGNOSIS — M17.0 OSTEOARTHRITIS OF BOTH KNEES, UNSPECIFIED OSTEOARTHRITIS TYPE: ICD-10-CM

## 2018-01-19 PROCEDURE — 99213 OFFICE O/P EST LOW 20 MIN: CPT | Performed by: INTERNAL MEDICINE

## 2018-01-19 RX ORDER — MELOXICAM 15 MG/1
15 TABLET ORAL DAILY
Qty: 30 TABLET | Refills: 3 | Status: SHIPPED | OUTPATIENT
Start: 2018-01-19 | End: 2018-05-30 | Stop reason: SDUPTHER

## 2018-01-19 RX ORDER — TRAMADOL HYDROCHLORIDE 50 MG/1
50 TABLET ORAL EVERY 8 HOURS PRN
Qty: 60 TABLET | Refills: 0 | Status: SHIPPED | OUTPATIENT
Start: 2018-01-19 | End: 2018-04-17 | Stop reason: SDUPTHER

## 2018-01-19 NOTE — PROGRESS NOTES
vaccine (2 - Td) 07/18/2026    Zostavax vaccine  Addressed    Flu vaccine  Completed    Pneumococcal med risk  Completed    Hepatitis C screen  Completed    HIV screen  Completed

## 2018-02-22 ENCOUNTER — HOSPITAL ENCOUNTER (OUTPATIENT)
Dept: NON INVASIVE DIAGNOSTICS | Age: 63
Discharge: HOME | End: 2018-02-22
Payer: MEDICARE

## 2018-02-22 ENCOUNTER — HOSPITAL ENCOUNTER (OUTPATIENT)
Dept: VASCULAR LAB | Age: 63
Discharge: HOME | End: 2018-02-22
Payer: MEDICARE

## 2018-02-22 DIAGNOSIS — M79.89 LEG SWELLING: ICD-10-CM

## 2018-02-22 DIAGNOSIS — R06.02 SHORTNESS OF BREATH: ICD-10-CM

## 2018-02-22 LAB
LV EF: 55 %
LVEF MODALITY: NORMAL

## 2018-02-22 PROCEDURE — 93306 TTE W/DOPPLER COMPLETE: CPT

## 2018-02-22 PROCEDURE — 93970 EXTREMITY STUDY: CPT

## 2018-02-23 ENCOUNTER — HOSPITAL ENCOUNTER (OUTPATIENT)
Dept: MAMMOGRAPHY | Age: 63
Discharge: HOME | End: 2018-02-25
Payer: MEDICARE

## 2018-02-23 ENCOUNTER — HOSPITAL ENCOUNTER (OUTPATIENT)
Age: 63
Discharge: HOME | End: 2018-02-23
Payer: MEDICARE

## 2018-02-23 DIAGNOSIS — Z12.31 ENCOUNTER FOR SCREENING MAMMOGRAM FOR MALIGNANT NEOPLASM OF BREAST: ICD-10-CM

## 2018-02-23 DIAGNOSIS — M79.89 LEG SWELLING: ICD-10-CM

## 2018-02-23 DIAGNOSIS — E11.9 TYPE 2 DIABETES MELLITUS WITHOUT COMPLICATION, WITHOUT LONG-TERM CURRENT USE OF INSULIN (HCC): Chronic | ICD-10-CM

## 2018-02-23 DIAGNOSIS — I10 ESSENTIAL HYPERTENSION: ICD-10-CM

## 2018-02-23 LAB
ABSOLUTE EOS #: 0.34 K/UL (ref 0–0.44)
ABSOLUTE IMMATURE GRANULOCYTE: 0.03 K/UL (ref 0–0.3)
ABSOLUTE LYMPH #: 2.09 K/UL (ref 1.1–3.7)
ABSOLUTE MONO #: 0.66 K/UL (ref 0.1–1.2)
ALBUMIN SERPL-MCNC: 4.2 G/DL (ref 3.5–5.2)
ALBUMIN/GLOBULIN RATIO: 1.2 (ref 1–2.5)
ALP BLD-CCNC: 93 U/L (ref 35–104)
ALT SERPL-CCNC: 22 U/L (ref 5–33)
ANION GAP SERPL CALCULATED.3IONS-SCNC: 13 MMOL/L (ref 9–17)
AST SERPL-CCNC: 18 U/L
BASOPHILS # BLD: 0 % (ref 0–2)
BASOPHILS ABSOLUTE: 0.03 K/UL (ref 0–0.2)
BILIRUB SERPL-MCNC: 0.5 MG/DL (ref 0.3–1.2)
BUN BLDV-MCNC: 11 MG/DL (ref 8–23)
BUN/CREAT BLD: ABNORMAL (ref 9–20)
CALCIUM SERPL-MCNC: 9.4 MG/DL (ref 8.6–10.4)
CHLORIDE BLD-SCNC: 103 MMOL/L (ref 98–107)
CO2: 28 MMOL/L (ref 20–31)
CREAT SERPL-MCNC: 0.45 MG/DL (ref 0.5–0.9)
CREATININE URINE: 19.8 MG/DL (ref 28–217)
DIFFERENTIAL TYPE: ABNORMAL
EOSINOPHILS RELATIVE PERCENT: 4 % (ref 1–4)
GFR AFRICAN AMERICAN: >60 ML/MIN
GFR NON-AFRICAN AMERICAN: >60 ML/MIN
GFR SERPL CREATININE-BSD FRML MDRD: ABNORMAL ML/MIN/{1.73_M2}
GFR SERPL CREATININE-BSD FRML MDRD: ABNORMAL ML/MIN/{1.73_M2}
GLUCOSE BLD-MCNC: 86 MG/DL (ref 70–99)
HCT VFR BLD CALC: 52.3 % (ref 36.3–47.1)
HEMOGLOBIN: 15.2 G/DL (ref 11.9–15.1)
IMMATURE GRANULOCYTES: 0 %
LYMPHOCYTES # BLD: 22 % (ref 24–43)
MCH RBC QN AUTO: 25.5 PG (ref 25.2–33.5)
MCHC RBC AUTO-ENTMCNC: 29.1 G/DL (ref 28.4–34.8)
MCV RBC AUTO: 87.6 FL (ref 82.6–102.9)
MICROALBUMIN/CREAT 24H UR: <12 MG/L
MICROALBUMIN/CREAT UR-RTO: ABNORMAL MCG/MG CREAT
MONOCYTES # BLD: 7 % (ref 3–12)
NRBC AUTOMATED: 0 PER 100 WBC
PDW BLD-RTO: 15.5 % (ref 11.8–14.4)
PLATELET # BLD: 267 K/UL (ref 138–453)
PLATELET ESTIMATE: ABNORMAL
PMV BLD AUTO: 11.3 FL (ref 8.1–13.5)
POTASSIUM SERPL-SCNC: 4.4 MMOL/L (ref 3.7–5.3)
RBC # BLD: 5.97 M/UL (ref 3.95–5.11)
RBC # BLD: ABNORMAL 10*6/UL
SEG NEUTROPHILS: 67 % (ref 36–65)
SEGMENTED NEUTROPHILS ABSOLUTE COUNT: 6.46 K/UL (ref 1.5–8.1)
SODIUM BLD-SCNC: 144 MMOL/L (ref 135–144)
TOTAL PROTEIN: 7.6 G/DL (ref 6.4–8.3)
TSH SERPL DL<=0.05 MIU/L-ACNC: 1.23 MIU/L (ref 0.3–5)
WBC # BLD: 9.6 K/UL (ref 3.5–11.3)
WBC # BLD: ABNORMAL 10*3/UL

## 2018-02-23 PROCEDURE — 84443 ASSAY THYROID STIM HORMONE: CPT

## 2018-02-23 PROCEDURE — 80053 COMPREHEN METABOLIC PANEL: CPT

## 2018-02-23 PROCEDURE — 85025 COMPLETE CBC W/AUTO DIFF WBC: CPT

## 2018-02-23 PROCEDURE — 77067 SCR MAMMO BI INCL CAD: CPT

## 2018-02-23 PROCEDURE — 82570 ASSAY OF URINE CREATININE: CPT

## 2018-02-23 PROCEDURE — 82043 UR ALBUMIN QUANTITATIVE: CPT

## 2018-02-23 PROCEDURE — 36415 COLL VENOUS BLD VENIPUNCTURE: CPT

## 2018-04-17 ENCOUNTER — OFFICE VISIT (OUTPATIENT)
Dept: INTERNAL MEDICINE | Age: 63
End: 2018-04-17
Payer: MEDICARE

## 2018-04-17 VITALS
BODY MASS INDEX: 55 KG/M2 | WEIGHT: 262 LBS | DIASTOLIC BLOOD PRESSURE: 86 MMHG | SYSTOLIC BLOOD PRESSURE: 148 MMHG | HEART RATE: 89 BPM | HEIGHT: 58 IN

## 2018-04-17 DIAGNOSIS — M17.0 PRIMARY OSTEOARTHRITIS OF BOTH KNEES: ICD-10-CM

## 2018-04-17 DIAGNOSIS — Z23 NEED FOR PROPHYLACTIC VACCINATION AND INOCULATION AGAINST VARICELLA: ICD-10-CM

## 2018-04-17 DIAGNOSIS — E78.1 HYPERTRIGLYCERIDEMIA: ICD-10-CM

## 2018-04-17 DIAGNOSIS — E11.9 TYPE 2 DIABETES MELLITUS WITHOUT COMPLICATION, WITHOUT LONG-TERM CURRENT USE OF INSULIN (HCC): Primary | Chronic | ICD-10-CM

## 2018-04-17 PROCEDURE — 99213 OFFICE O/P EST LOW 20 MIN: CPT

## 2018-04-17 PROCEDURE — 99213 OFFICE O/P EST LOW 20 MIN: CPT | Performed by: INTERNAL MEDICINE

## 2018-04-17 RX ORDER — TRAMADOL HYDROCHLORIDE 50 MG/1
50 TABLET ORAL EVERY 8 HOURS PRN
Qty: 60 TABLET | Refills: 0 | Status: SHIPPED | OUTPATIENT
Start: 2018-04-17 | End: 2018-05-18

## 2018-04-18 ENCOUNTER — OFFICE VISIT (OUTPATIENT)
Dept: PODIATRY | Age: 63
End: 2018-04-18
Payer: MEDICARE

## 2018-04-18 VITALS
WEIGHT: 264.4 LBS | HEART RATE: 88 BPM | BODY MASS INDEX: 55.5 KG/M2 | HEIGHT: 58 IN | SYSTOLIC BLOOD PRESSURE: 155 MMHG | DIASTOLIC BLOOD PRESSURE: 83 MMHG

## 2018-04-18 DIAGNOSIS — S90.852A: Primary | ICD-10-CM

## 2018-04-18 DIAGNOSIS — E11.69 DIABETES MELLITUS TYPE 2 IN OBESE (HCC): ICD-10-CM

## 2018-04-18 DIAGNOSIS — B35.1 ONYCHOMYCOSIS: ICD-10-CM

## 2018-04-18 DIAGNOSIS — E66.9 DIABETES MELLITUS TYPE 2 IN OBESE (HCC): ICD-10-CM

## 2018-04-18 PROCEDURE — 99213 OFFICE O/P EST LOW 20 MIN: CPT

## 2018-04-18 PROCEDURE — 99214 OFFICE O/P EST MOD 30 MIN: CPT | Performed by: STUDENT IN AN ORGANIZED HEALTH CARE EDUCATION/TRAINING PROGRAM

## 2018-05-16 ENCOUNTER — HOSPITAL ENCOUNTER (OUTPATIENT)
Age: 63
Discharge: HOME | End: 2018-05-18
Payer: MEDICARE

## 2018-05-16 ENCOUNTER — HOSPITAL ENCOUNTER (OUTPATIENT)
Dept: GENERAL RADIOLOGY | Age: 63
Discharge: HOME | End: 2018-05-18
Payer: MEDICARE

## 2018-05-16 ENCOUNTER — HOSPITAL ENCOUNTER (OUTPATIENT)
Age: 63
Discharge: HOME | End: 2018-05-16
Payer: MEDICARE

## 2018-05-16 ENCOUNTER — OFFICE VISIT (OUTPATIENT)
Dept: PODIATRY | Age: 63
End: 2018-05-16
Payer: MEDICARE

## 2018-05-16 VITALS
HEIGHT: 58 IN | WEIGHT: 263 LBS | SYSTOLIC BLOOD PRESSURE: 154 MMHG | HEART RATE: 79 BPM | BODY MASS INDEX: 55.2 KG/M2 | DIASTOLIC BLOOD PRESSURE: 87 MMHG

## 2018-05-16 DIAGNOSIS — E11.69 DIABETES MELLITUS TYPE 2 IN OBESE (HCC): Primary | ICD-10-CM

## 2018-05-16 DIAGNOSIS — S90.852A: ICD-10-CM

## 2018-05-16 DIAGNOSIS — S90.852S FOREIGN BODY IN LEFT FOOT, SEQUELA: ICD-10-CM

## 2018-05-16 DIAGNOSIS — M72.2 PLANTAR FASCIITIS OF RIGHT FOOT: ICD-10-CM

## 2018-05-16 DIAGNOSIS — B35.1 ONYCHOMYCOSIS: ICD-10-CM

## 2018-05-16 DIAGNOSIS — E66.9 DIABETES MELLITUS TYPE 2 IN OBESE (HCC): Primary | ICD-10-CM

## 2018-05-16 DIAGNOSIS — E11.9 TYPE 2 DIABETES MELLITUS WITHOUT COMPLICATION, WITHOUT LONG-TERM CURRENT USE OF INSULIN (HCC): Chronic | ICD-10-CM

## 2018-05-16 DIAGNOSIS — E78.1 HYPERTRIGLYCERIDEMIA: ICD-10-CM

## 2018-05-16 LAB
CHOLESTEROL/HDL RATIO: 2.3
CHOLESTEROL: 151 MG/DL
ESTIMATED AVERAGE GLUCOSE: 134 MG/DL
HBA1C MFR BLD: 6.3 % (ref 4–6)
HDLC SERPL-MCNC: 66 MG/DL
LDL CHOLESTEROL: 66 MG/DL (ref 0–130)
TRIGL SERPL-MCNC: 94 MG/DL
VLDLC SERPL CALC-MCNC: NORMAL MG/DL (ref 1–30)

## 2018-05-16 PROCEDURE — 83036 HEMOGLOBIN GLYCOSYLATED A1C: CPT

## 2018-05-16 PROCEDURE — 99213 OFFICE O/P EST LOW 20 MIN: CPT | Performed by: STUDENT IN AN ORGANIZED HEALTH CARE EDUCATION/TRAINING PROGRAM

## 2018-05-16 PROCEDURE — 36415 COLL VENOUS BLD VENIPUNCTURE: CPT

## 2018-05-16 PROCEDURE — 99213 OFFICE O/P EST LOW 20 MIN: CPT

## 2018-05-16 PROCEDURE — 73630 X-RAY EXAM OF FOOT: CPT

## 2018-05-16 PROCEDURE — 80061 LIPID PANEL: CPT

## 2018-05-30 DIAGNOSIS — M17.0 PRIMARY OSTEOARTHRITIS OF BOTH KNEES: ICD-10-CM

## 2018-05-30 RX ORDER — MELOXICAM 15 MG/1
15 TABLET ORAL DAILY
Qty: 30 TABLET | Refills: 0 | Status: SHIPPED | OUTPATIENT
Start: 2018-05-30 | End: 2018-07-03 | Stop reason: SDUPTHER

## 2018-07-12 ENCOUNTER — TELEPHONE (OUTPATIENT)
Dept: INTERNAL MEDICINE | Age: 63
End: 2018-07-12

## 2018-07-12 NOTE — TELEPHONE ENCOUNTER
Telephone call made to notify patient of appointment change there was no answer, left message on machine. New appointment date and time given. Letter mailed. Next Visit Date:  Future Appointments  Date Time Provider Jerry Clark   8/8/2018 2:10 PM CLIFTON Cardona Cally [facility]   8/22/2018 1:45 PM Livia Faria DPM [facility]   9/17/2018 1:00 PM Corrina Reyes MD [facility] IM [address] Maintenance   Topic Date Due    Colon Cancer Screen FIT/FOBT  06/29/2018    Shingles Vaccine (1 of 2 - 2 Dose Series) 07/17/2018 (Originally 4/21/2004)    Flu vaccine (1) 09/01/2018    Diabetic foot exam  01/17/2019    Diabetic retinal exam  02/16/2019    Diabetic microalbuminuria test  02/23/2019    Potassium monitoring  02/23/2019    Creatinine monitoring  02/23/2019    A1C test (Diabetic or Prediabetic)  05/16/2019    Lipid screen  05/16/2019    Breast cancer screen  02/23/2020    DTaP/Tdap/Td vaccine (2 - Td) 07/18/2026    Pneumococcal med risk  Completed    Hepatitis C screen  Completed    HIV screen  Completed       Hemoglobin A1C (%)   Date Value   05/16/2018 6.3 (H)   10/11/2017 6.3   03/06/2017 6.1             ( goal A1C is < 7)   Microalb/Crt.  Ratio (mcg/mg creat)   Date Value   02/23/2018 CANNOT BE CALCULATED     LDL Cholesterol (mg/dL)   Date Value   05/16/2018 66   03/06/2017 56       (goal LDL is <100)   AST (U/L)   Date Value   02/23/2018 18     ALT (U/L)   Date Value   02/23/2018 22     BUN (mg/dL)   Date Value   02/23/2018 11     BP Readings from Last 3 Encounters:   05/16/18 (!) 154/87   04/18/18 (!) 155/83   04/17/18 (!) 148/86          (goal 120/80)    All Future Testing planned in [facility]  Lab Frequency Next Occurrence               Patient Active Problem List:     Osteoarthritis     Eczema     Obesity     GERD (gastroesophageal reflux disease)     Hypertriglyceridemia     HTN (hypertension)     Hypopigmented skin lesion     Asthma     Allergic

## 2018-08-22 ENCOUNTER — HOSPITAL ENCOUNTER (OUTPATIENT)
Dept: GENERAL RADIOLOGY | Age: 63
Discharge: HOME | End: 2018-08-24
Payer: MEDICARE

## 2018-08-22 ENCOUNTER — OFFICE VISIT (OUTPATIENT)
Dept: PODIATRY | Age: 63
End: 2018-08-22
Payer: MEDICARE

## 2018-08-22 ENCOUNTER — HOSPITAL ENCOUNTER (OUTPATIENT)
Age: 63
Discharge: HOME | End: 2018-08-24
Payer: MEDICARE

## 2018-08-22 VITALS
DIASTOLIC BLOOD PRESSURE: 79 MMHG | SYSTOLIC BLOOD PRESSURE: 132 MMHG | WEIGHT: 260 LBS | HEART RATE: 86 BPM | BODY MASS INDEX: 54.57 KG/M2 | HEIGHT: 58 IN

## 2018-08-22 DIAGNOSIS — B35.1 ONYCHOMYCOSIS: Primary | ICD-10-CM

## 2018-08-22 DIAGNOSIS — M79.671 FOOT PAIN, BILATERAL: ICD-10-CM

## 2018-08-22 DIAGNOSIS — M72.2 PLANTAR FASCIITIS OF RIGHT FOOT: ICD-10-CM

## 2018-08-22 DIAGNOSIS — E11.42 CONTROLLED TYPE 2 DIABETES MELLITUS WITH DIABETIC POLYNEUROPATHY, WITHOUT LONG-TERM CURRENT USE OF INSULIN (HCC): ICD-10-CM

## 2018-08-22 DIAGNOSIS — M79.672 FOOT PAIN, BILATERAL: ICD-10-CM

## 2018-08-22 PROCEDURE — 99213 OFFICE O/P EST LOW 20 MIN: CPT | Performed by: PODIATRIST

## 2018-08-22 PROCEDURE — 11721 DEBRIDE NAIL 6 OR MORE: CPT | Performed by: PODIATRIST

## 2018-08-22 PROCEDURE — 99213 OFFICE O/P EST LOW 20 MIN: CPT

## 2018-08-22 PROCEDURE — 73630 X-RAY EXAM OF FOOT: CPT

## 2018-08-22 NOTE — PROGRESS NOTES
Patient instructed to remove shoes and socks, instructed to sit in exam chair. Current PCP name is Dr Fred Vazquez  and date of last visit 5-16-18. Do you have a follow up visit scheduled? Yes or no    If yes the date is 9-17-18    Diabetic visit information    Blood pressure (Control is BP <140/90)  BP Readings from Last 3 Encounters:   05/16/18 (!) 154/87   04/18/18 (!) 155/83   04/17/18 (!) 148/86       BP taken with correct size cuff? - Yes   Repeated if > 140/90 Yes      Tobacco use:  Patient  reports that she quit smoking about 11 years ago. Her smoking use included Cigarettes. She has a 7.50 pack-year smoking history. She has quit using smokeless tobacco.  If Smoker - Cessation materials given? - Yes       Diabetic Health Maintenance Items due  There are no preventive care reminders to display for this patient. Diabetic retinal exam done in last year? - Yes   If No: remind patient that it is due and they should schedule an exam    Medications  Is patient taking any medications for diabetes? -   Yes  Have blood sugars been controlled? Fasting blood sugars under 120   -   Yes   Random home sugars or today's POCT glucose is under 180 -   Yes   []  If No to the above then patient should schedule appt with PCP.      Diabetic Plan    A1C Plan  Lab Results   Component Value Date    LABA1C 6.3 (H) 05/16/2018    LABA1C 6.3 10/11/2017    LABA1C 6.1 03/06/2017      []  If A1C over 8 and last result >3 months ago - Order A1C and refer to PCP   []  If last A1C over 6 months ago - Order A1C and refer to PCP for follow up   []  If elevated blood sugars > 180 - refer to PCP for follow up    []  Blood sugar controlled - A1C under 8 and last check was < 6 months      Cholesterol Plan   Lab Results   Component Value Date    LDLCHOLESTEROL 66 05/16/2018      []  If LDL > 100 and last result >3 months ago - order Fasting lipids and refer to PCP for follow up   []  If LDL < 100 and over 1 year ago - Order Fasting lipids and refer
evaluated. Instructed to wear Powerstep inserts in supportive tennis shoes at all times  Educated on proper stretching  XR right foot    Aquaphor under occlusion for dry skin; AmlactinUltra OTC also recommended  Educated on the importance of tight control of blood glucose levels for vascular, neurologic, and overall health. Educated on the importance of daily foot exams and what to look for, including open sores, foreign bodies, and signs of infection. Return to clinic in 1 month   Discussed with Dr. Narcisa Lynch This Encounter   Medications    mineral oil-hydrophilic petrolatum (AQUAPHOR) ointment     Sig: Apply topically as needed.      Dispense:  396 g     Refill:  3     Orders Placed This Encounter   Procedures    XR FOOT RIGHT (MIN 3 VIEWS)     Standing Status:   Future     Standing Expiration Date:   8/22/2019     Order Specific Question:   Reason for exam:     Answer:   Right heel pain     DIABETES FOOT EXAM    41143 - [place] DEBRIDEMENT OF NAILS, 6 OR MORE       Electronically signed by Jean Marie Murcia DPM on 8/22/2018 at 2:28 PM

## 2018-08-28 DIAGNOSIS — M17.0 PRIMARY OSTEOARTHRITIS OF BOTH KNEES: Primary | ICD-10-CM

## 2018-08-29 ENCOUNTER — OFFICE VISIT (OUTPATIENT)
Dept: ORTHOPEDIC SURGERY | Age: 63
End: 2018-08-29
Payer: MEDICARE

## 2018-08-29 VITALS — HEIGHT: 58 IN | BODY MASS INDEX: 54.56 KG/M2 | WEIGHT: 259.92 LBS

## 2018-08-29 DIAGNOSIS — M17.0 PRIMARY OSTEOARTHRITIS OF BOTH KNEES: Primary | ICD-10-CM

## 2018-08-29 PROCEDURE — 99213 OFFICE O/P EST LOW 20 MIN: CPT | Performed by: STUDENT IN AN ORGANIZED HEALTH CARE EDUCATION/TRAINING PROGRAM

## 2018-08-29 ASSESSMENT — ENCOUNTER SYMPTOMS
WHEEZING: 0
CONSTIPATION: 0
SHORTNESS OF BREATH: 0
CHEST TIGHTNESS: 0
NAUSEA: 0
COUGH: 0
ABDOMINAL PAIN: 0

## 2018-09-10 NOTE — PROGRESS NOTES
Attending Physician Statement  I have discussed the case, including pertinent history and exam findings with the resident. I have seen and examined the patient on 8/29/2018 and the key elements of the encounter have been performed by me. I agree with the assessment, plan and orders as documented by the resident.

## 2018-09-26 ENCOUNTER — OFFICE VISIT (OUTPATIENT)
Dept: PODIATRY | Age: 63
End: 2018-09-26
Payer: MEDICARE

## 2018-09-26 VITALS
SYSTOLIC BLOOD PRESSURE: 137 MMHG | DIASTOLIC BLOOD PRESSURE: 77 MMHG | HEART RATE: 82 BPM | HEIGHT: 58 IN | BODY MASS INDEX: 52.06 KG/M2 | WEIGHT: 248 LBS

## 2018-09-26 DIAGNOSIS — M72.2 PLANTAR FASCIITIS OF RIGHT FOOT: Primary | ICD-10-CM

## 2018-09-26 DIAGNOSIS — L85.3 XEROSIS OF SKIN: ICD-10-CM

## 2018-09-26 DIAGNOSIS — M79.672 FOOT PAIN, BILATERAL: ICD-10-CM

## 2018-09-26 DIAGNOSIS — E11.69 DIABETES MELLITUS TYPE 2 IN OBESE (HCC): ICD-10-CM

## 2018-09-26 DIAGNOSIS — B35.1 ONYCHOMYCOSIS: ICD-10-CM

## 2018-09-26 DIAGNOSIS — E66.9 DIABETES MELLITUS TYPE 2 IN OBESE (HCC): ICD-10-CM

## 2018-09-26 DIAGNOSIS — M79.671 FOOT PAIN, BILATERAL: ICD-10-CM

## 2018-09-26 PROCEDURE — 11721 DEBRIDE NAIL 6 OR MORE: CPT | Performed by: PODIATRIST

## 2018-09-26 PROCEDURE — 99212 OFFICE O/P EST SF 10 MIN: CPT | Performed by: PODIATRIST

## 2018-09-26 RX ORDER — AMMONIUM LACTATE 12 G/100G
LOTION TOPICAL
Qty: 1 BOTTLE | Refills: 2 | Status: SHIPPED | OUTPATIENT
Start: 2018-09-26 | End: 2019-05-23

## 2018-09-26 NOTE — PROGRESS NOTES
Subjective:      Emily Michaud is a 58 y.o. female who presents to the clinic complaining of elongated toenails and right heel pain. Patient has been stretching as instructed. Pt got her xray as instructed. Pt denies getting her powersteps. Denies numbness, tingling, and burning in the hands and feet. Denies cramping in the calves with walking. Denies open wounds. PCP is Leena Sellers MD      Objective:   Physical Exam   Vitals:    09/26/18 1347   BP: 137/77   Pulse: 82        Lab Results   Component Value Date    LABA1C 6.3 (H) 05/16/2018       Gen: AAOx3, NAD     Vascular: DP pulses are 2/4 palpable, bilateral. PT pulses are 1/4 palpable, bilateral. CFT is brisk to all digits. Skin temp is warm to warm proximal to distal, bilateral. Mild edema noted to bilateral ankles. No varicosities noted, bilateral.      Neurologic:  Epicritic sensation intact, Bilateral.      Dermatologic: Skin is noted to be warm dry and xerotic. Toenails 1-5 b/l are mildly elongated, thickened and discolored with subungual debris. No open wounds or interdigital maceration 1 through 4 bilateral.    Musculoskeletal: Muscle strength 5/5 for all LE muscle groups. Brachymetatarsal noted to right 4th. TTP of plantar medial tubercle of right calcaneus and central band of plantar fascia. No palpable nodule in fascia. No pain with medial to lateral compression of the calcaneus. No ecchymosis. XR LEFT FOOT: 8/22/18   FINDINGS:   No acute fractures or dislocations are identified in the right foot.  Tarsal   metatarsal alignment is normal. No lytic or blastic lesions present in all   visualized osseous structures.  Short right 4th metatarsal.  Mild   degenerative changes of the 1st right metatarsophalangeal joint.       Moderate-sized enthesophyte formation at the calcaneal attachment of plantar   fascia.       No bony erosions seen.           Impression   Moderate-sized enthesophyte formation at the calcaneal attachment of plantar   fascia. Assessment:   Pt is a 58 y.o. female with      Diagnosis Orders   1. Plantar fasciitis of right foot     2. Onychomycosis  70630 - [place] DEBRIDEMENT OF NAILS, 6 OR MORE   3. Foot pain, bilateral  23221 - [place] DEBRIDEMENT OF NAILS, 6 OR MORE   4. Diabetes mellitus type 2 in obese (HCC)  60027 - [place] DEBRIDEMENT OF NAILS, 6 OR MORE   5. Xerosis of skin         Plan:      Patient seen and evaluated. Instructed pt to wear Powerstep inserts in supportive tennis shoes at all times  Educated on proper stretching for plantar fascitis. Reviewed radiographs with patient. Amlactin samples given. Educated on the importance of tight control of blood glucose levels for vascular, neurologic, and overall health. Educated on the importance of daily foot exams and what to look for, including open sores, foreign bodies, and signs of infection. Debridement of nails x10 with sterile nail nippers without incident. Will cast for orthotics at next visit. Return to clinic in 2 month. Discussed with Dr. Wilson Cost This Encounter   Medications    ammonium lactate (LAC-HYDRIN) 12 % lotion     Sig: Apply topically daily.      Dispense:  1 Bottle     Refill:  2     Orders Placed This Encounter   Procedures    19993 - [place] DEBRIDEMENT OF NAILS, 6 OR MORE       Electronically signed by Frankie Mulligan DPM on 9/26/2018 at 2:41 PM

## 2018-09-26 NOTE — PROGRESS NOTES
controlled. LDL < 100 and checked within the last year     Blood Pressure  BP Readings from Last 3 Encounters:   08/22/18 132/79   05/16/18 (!) 154/87   04/18/18 (!) 155/83      []  If SBP >140 mmhg - refer to PCP for follow up   []  If DBP > 90 mmhg - refer to PCP for follow up   [] BP is controlled <140/90     Order labs as PCP ordered.   (ie: Lipids, A1C, CMP)

## 2018-10-25 DIAGNOSIS — M17.0 PRIMARY OSTEOARTHRITIS OF BOTH KNEES: ICD-10-CM

## 2018-10-25 RX ORDER — MELOXICAM 15 MG/1
15 TABLET ORAL DAILY
Qty: 30 TABLET | Refills: 2 | Status: SHIPPED | OUTPATIENT
Start: 2018-10-25 | End: 2018-12-13 | Stop reason: SDUPTHER

## 2018-12-05 ENCOUNTER — OFFICE VISIT (OUTPATIENT)
Dept: PODIATRY | Age: 63
End: 2018-12-05
Payer: MEDICARE

## 2018-12-05 VITALS
SYSTOLIC BLOOD PRESSURE: 133 MMHG | DIASTOLIC BLOOD PRESSURE: 74 MMHG | HEIGHT: 58 IN | WEIGHT: 258 LBS | HEART RATE: 84 BPM | BODY MASS INDEX: 54.16 KG/M2

## 2018-12-05 DIAGNOSIS — L85.3 XEROSIS OF SKIN: ICD-10-CM

## 2018-12-05 DIAGNOSIS — B35.1 ONYCHOMYCOSIS: ICD-10-CM

## 2018-12-05 DIAGNOSIS — M72.2 PLANTAR FASCIITIS OF RIGHT FOOT: Primary | ICD-10-CM

## 2018-12-05 DIAGNOSIS — E66.9 DIABETES MELLITUS TYPE 2 IN OBESE (HCC): ICD-10-CM

## 2018-12-05 DIAGNOSIS — M79.672 FOOT PAIN, BILATERAL: ICD-10-CM

## 2018-12-05 DIAGNOSIS — E11.69 DIABETES MELLITUS TYPE 2 IN OBESE (HCC): ICD-10-CM

## 2018-12-05 DIAGNOSIS — M79.671 FOOT PAIN, BILATERAL: ICD-10-CM

## 2018-12-05 PROCEDURE — 99212 OFFICE O/P EST SF 10 MIN: CPT | Performed by: PODIATRIST

## 2018-12-05 PROCEDURE — L3020 FOOT LONGITUD/METATARSAL SUP: HCPCS | Performed by: PODIATRIST

## 2018-12-05 PROCEDURE — 99213 OFFICE O/P EST LOW 20 MIN: CPT | Performed by: PODIATRIST

## 2018-12-05 NOTE — PROGRESS NOTES
Patient instructed to remove shoes and socks, instructed to sit in exam chair. Current PCP name is  and date of last visit 4/17/18. Do you have a follow up visit scheduled? No  Diabetic visit information    Blood pressure (Control is BP <140/90)  BP Readings from Last 3 Encounters:   09/26/18 137/77   08/22/18 132/79   05/16/18 (!) 154/87       BP taken with correct size cuff? - Yes   Repeated if > 140/90 No      Tobacco use:  Patient  reports that she quit smoking about 11 years ago. Her smoking use included Cigarettes. She has a 7.50 pack-year smoking history. She has quit using smokeless tobacco.  If Smoker - Cessation materials given?- No       Diabetic Health Maintenance Items due  There are no preventive care reminders to display for this patient. Diabetic retinal exam done in last year? - Yes   If No: remind patient that it is due and they should schedule an exam    Medications  Is patient taking any medications for diabetes? -   Yes  Have blood sugars been controlled? Fasting blood sugars under 120   -   Yes   Random home sugars or today's POCT glucose is under 180 -   Yes   []  If No to the above then patient should schedule appt with PCP. Diabetic Plan    A1C Plan  Lab Results   Component Value Date    LABA1C 6.3 (H) 05/16/2018    LABA1C 6.3 10/11/2017    LABA1C 6.1 03/06/2017      []  If A1C over 8 and last result >3 months ago - Order A1C and refer to PCP   []  If last A1C over 6 months ago - Order A1C and refer to PCP for follow up   []  If elevated blood sugars > 180 - refer to PCP for follow up    []  Blood sugar controlled - A1C under 8 and last check was < 6 months      Cholesterol Plan   Lab Results   Component Value Date    LDLCHOLESTEROL 66 05/16/2018      []  If LDL > 100 and last result >3 months ago - order Fasting lipids and refer to PCP for follow up   []  If LDL < 100 and over 1 year ago - Order Fasting lipids and refer to PCP for follow up   [] LDL is controlled.   LDL <

## 2018-12-13 ENCOUNTER — OFFICE VISIT (OUTPATIENT)
Dept: INTERNAL MEDICINE | Age: 63
End: 2018-12-13
Payer: MEDICARE

## 2018-12-13 VITALS
DIASTOLIC BLOOD PRESSURE: 88 MMHG | HEART RATE: 75 BPM | SYSTOLIC BLOOD PRESSURE: 158 MMHG | WEIGHT: 256 LBS | BODY MASS INDEX: 53.5 KG/M2

## 2018-12-13 DIAGNOSIS — Z12.11 COLON CANCER SCREENING: ICD-10-CM

## 2018-12-13 DIAGNOSIS — I10 ESSENTIAL HYPERTENSION: ICD-10-CM

## 2018-12-13 DIAGNOSIS — K21.9 GASTROESOPHAGEAL REFLUX DISEASE, ESOPHAGITIS PRESENCE NOT SPECIFIED: ICD-10-CM

## 2018-12-13 DIAGNOSIS — Z23 NEEDS FLU SHOT: ICD-10-CM

## 2018-12-13 DIAGNOSIS — E11.9 TYPE 2 DIABETES MELLITUS WITHOUT COMPLICATION, WITHOUT LONG-TERM CURRENT USE OF INSULIN (HCC): Primary | Chronic | ICD-10-CM

## 2018-12-13 DIAGNOSIS — M17.0 PRIMARY OSTEOARTHRITIS OF BOTH KNEES: ICD-10-CM

## 2018-12-13 LAB — HBA1C MFR BLD: 6.3 %

## 2018-12-13 PROCEDURE — 90686 IIV4 VACC NO PRSV 0.5 ML IM: CPT | Performed by: INTERNAL MEDICINE

## 2018-12-13 PROCEDURE — 99211 OFF/OP EST MAY X REQ PHY/QHP: CPT | Performed by: INTERNAL MEDICINE

## 2018-12-13 PROCEDURE — 83036 HEMOGLOBIN GLYCOSYLATED A1C: CPT | Performed by: INTERNAL MEDICINE

## 2018-12-13 PROCEDURE — 99213 OFFICE O/P EST LOW 20 MIN: CPT | Performed by: INTERNAL MEDICINE

## 2018-12-13 RX ORDER — LISINOPRIL 30 MG/1
30 TABLET ORAL DAILY
Qty: 90 TABLET | Refills: 5 | Status: SHIPPED | OUTPATIENT
Start: 2018-12-13 | End: 2019-05-23 | Stop reason: SDUPTHER

## 2018-12-13 RX ORDER — CLOTRIMAZOLE AND BETAMETHASONE DIPROPIONATE 10; .64 MG/G; MG/G
CREAM TOPICAL
Qty: 1 TUBE | Refills: 3 | Status: SHIPPED | OUTPATIENT
Start: 2018-12-13

## 2018-12-13 RX ORDER — OMEPRAZOLE 20 MG/1
20 CAPSULE, DELAYED RELEASE ORAL DAILY
Qty: 90 CAPSULE | Refills: 3 | Status: SHIPPED | OUTPATIENT
Start: 2018-12-13

## 2018-12-13 RX ORDER — GLUCOSAMINE HCL/CHONDROITIN SU 500-400 MG
1 CAPSULE ORAL 2 TIMES DAILY
Qty: 100 STRIP | Refills: 11 | Status: SHIPPED | OUTPATIENT
Start: 2018-12-13

## 2018-12-13 RX ORDER — TRAMADOL HYDROCHLORIDE 50 MG/1
50 TABLET ORAL 2 TIMES DAILY PRN
Qty: 60 TABLET | Refills: 0 | Status: SHIPPED | OUTPATIENT
Start: 2018-12-13 | End: 2019-04-17 | Stop reason: SDUPTHER

## 2018-12-13 RX ORDER — ATORVASTATIN CALCIUM 20 MG/1
20 TABLET, FILM COATED ORAL DAILY
Qty: 90 TABLET | Refills: 2 | Status: SHIPPED | OUTPATIENT
Start: 2018-12-13 | End: 2019-09-09 | Stop reason: SDUPTHER

## 2018-12-13 RX ORDER — FLUTICASONE PROPIONATE 50 MCG
1 SPRAY, SUSPENSION (ML) NASAL DAILY
Qty: 3 BOTTLE | Refills: 3 | Status: SHIPPED | OUTPATIENT
Start: 2018-12-13

## 2018-12-13 RX ORDER — MELOXICAM 15 MG/1
15 TABLET ORAL DAILY
Qty: 30 TABLET | Refills: 2 | Status: SHIPPED | OUTPATIENT
Start: 2018-12-13 | End: 2019-03-29 | Stop reason: SDUPTHER

## 2018-12-13 RX ORDER — LORATADINE 10 MG/1
10 TABLET ORAL DAILY
Qty: 90 TABLET | Refills: 3 | Status: SHIPPED | OUTPATIENT
Start: 2018-12-13 | End: 2019-05-23 | Stop reason: SDUPTHER

## 2018-12-13 RX ORDER — BLOOD-GLUCOSE METER
1 KIT MISCELLANEOUS DAILY
Qty: 1 KIT | Refills: 0 | Status: SHIPPED | OUTPATIENT
Start: 2018-12-13 | End: 2019-05-23

## 2018-12-13 RX ORDER — ALBUTEROL SULFATE 90 UG/1
2 AEROSOL, METERED RESPIRATORY (INHALATION) EVERY 6 HOURS PRN
Qty: 3 INHALER | Refills: 3 | Status: SHIPPED | OUTPATIENT
Start: 2018-12-13

## 2018-12-13 ASSESSMENT — PATIENT HEALTH QUESTIONNAIRE - PHQ9
SUM OF ALL RESPONSES TO PHQ QUESTIONS 1-9: 0
2. FEELING DOWN, DEPRESSED OR HOPELESS: 0
SUM OF ALL RESPONSES TO PHQ9 QUESTIONS 1 & 2: 0
1. LITTLE INTEREST OR PLEASURE IN DOING THINGS: 0
SUM OF ALL RESPONSES TO PHQ QUESTIONS 1-9: 0

## 2018-12-27 ENCOUNTER — TELEPHONE (OUTPATIENT)
Dept: INTERNAL MEDICINE | Age: 63
End: 2018-12-27

## 2019-01-16 ENCOUNTER — OFFICE VISIT (OUTPATIENT)
Dept: PODIATRY | Age: 64
End: 2019-01-16
Payer: MEDICARE

## 2019-01-16 VITALS
WEIGHT: 255.73 LBS | HEART RATE: 74 BPM | DIASTOLIC BLOOD PRESSURE: 87 MMHG | BODY MASS INDEX: 53.68 KG/M2 | HEIGHT: 58 IN | SYSTOLIC BLOOD PRESSURE: 133 MMHG

## 2019-01-16 DIAGNOSIS — E11.69 DIABETES MELLITUS TYPE 2 IN OBESE (HCC): ICD-10-CM

## 2019-01-16 DIAGNOSIS — M72.2 PLANTAR FASCIITIS OF RIGHT FOOT: Primary | ICD-10-CM

## 2019-01-16 DIAGNOSIS — M79.672 FOOT PAIN, BILATERAL: ICD-10-CM

## 2019-01-16 DIAGNOSIS — B35.1 ONYCHOMYCOSIS: ICD-10-CM

## 2019-01-16 DIAGNOSIS — M79.671 FOOT PAIN, BILATERAL: ICD-10-CM

## 2019-01-16 DIAGNOSIS — E66.9 DIABETES MELLITUS TYPE 2 IN OBESE (HCC): ICD-10-CM

## 2019-01-16 PROCEDURE — 99212 OFFICE O/P EST SF 10 MIN: CPT | Performed by: STUDENT IN AN ORGANIZED HEALTH CARE EDUCATION/TRAINING PROGRAM

## 2019-01-25 ENCOUNTER — HOSPITAL ENCOUNTER (OUTPATIENT)
Age: 64
Setting detail: SPECIMEN
Discharge: HOME | End: 2019-01-25
Payer: MEDICARE

## 2019-01-30 DIAGNOSIS — Z12.11 COLON CANCER SCREENING: ICD-10-CM

## 2019-01-30 LAB
CONTROL: NORMAL
HEMOCCULT STL QL: NORMAL

## 2019-01-30 PROCEDURE — 82274 ASSAY TEST FOR BLOOD FECAL: CPT

## 2019-02-13 ENCOUNTER — TELEPHONE (OUTPATIENT)
Dept: PODIATRY | Age: 64
End: 2019-02-13

## 2019-04-25 ENCOUNTER — OFFICE VISIT (OUTPATIENT)
Dept: INTERNAL MEDICINE | Age: 64
End: 2019-04-25
Payer: MEDICARE

## 2019-04-25 VITALS
BODY MASS INDEX: 52.25 KG/M2 | WEIGHT: 250 LBS | SYSTOLIC BLOOD PRESSURE: 191 MMHG | DIASTOLIC BLOOD PRESSURE: 113 MMHG | HEART RATE: 82 BPM

## 2019-04-25 DIAGNOSIS — I10 ESSENTIAL HYPERTENSION: ICD-10-CM

## 2019-04-25 DIAGNOSIS — E11.9 TYPE 2 DIABETES MELLITUS WITHOUT COMPLICATION, WITHOUT LONG-TERM CURRENT USE OF INSULIN (HCC): Chronic | ICD-10-CM

## 2019-04-25 DIAGNOSIS — Z78.0 POST-MENOPAUSAL: Primary | ICD-10-CM

## 2019-04-25 DIAGNOSIS — M17.0 PRIMARY OSTEOARTHRITIS OF BOTH KNEES: ICD-10-CM

## 2019-04-25 DIAGNOSIS — Z12.39 SCREENING FOR BREAST CANCER: ICD-10-CM

## 2019-04-25 PROCEDURE — 99214 OFFICE O/P EST MOD 30 MIN: CPT | Performed by: INTERNAL MEDICINE

## 2019-04-25 PROCEDURE — 99211 OFF/OP EST MAY X REQ PHY/QHP: CPT | Performed by: INTERNAL MEDICINE

## 2019-04-25 PROCEDURE — 90670 PCV13 VACCINE IM: CPT | Performed by: INTERNAL MEDICINE

## 2019-04-25 RX ORDER — MELOXICAM 15 MG/1
15 TABLET ORAL DAILY
Qty: 90 TABLET | Refills: 0 | Status: SHIPPED | OUTPATIENT
Start: 2019-04-25 | End: 2019-07-25 | Stop reason: SDUPTHER

## 2019-04-25 ASSESSMENT — PATIENT HEALTH QUESTIONNAIRE - PHQ9
SUM OF ALL RESPONSES TO PHQ QUESTIONS 1-9: 0
1. LITTLE INTEREST OR PLEASURE IN DOING THINGS: 0
SUM OF ALL RESPONSES TO PHQ9 QUESTIONS 1 & 2: 0
2. FEELING DOWN, DEPRESSED OR HOPELESS: 0
SUM OF ALL RESPONSES TO PHQ QUESTIONS 1-9: 0

## 2019-04-25 NOTE — LETTER
MEDICATION AGREEMENT     Matthias Drake  [birth year]      For certain conditions, multiple classes of medications may be used to help better manage your symptoms, and to improve your ability to function at home, work and in social settings. However, these medications do have risks, which will be discussed with you, including addiction and dependency. The following prescribed medications need frequent monitoring and will require you to partner and assist in your healthcare. Medication  Dose, instructions and quantity as indicated on current prescription bottle Diagnosis/Reason(s) for Taking Category   Tramadol 50 mg  BID PRN 60 tabs  Knee Pain Narcotics                            Benefits and goals of Controlled Substance Medications: There are two potential goals for your treatment: (1) decreased pain and suffering (2) improved daily life functions. There are many possible treatments for your chronic condition(s), and, in addition to controlled substance medications, we will try alternatives such as physical therapy, yoga, massage, home daily exercise, meditation, relaxation techniques, injections, chiropractic manipulations, surgery, cognitive therapy, hypnosis and many medications that are not habit-forming. Use of controlled substance medications may be helpful, but they are unlikely to resolve all of your symptoms or restore all function. Risks of Controlled Substance Medications:    Opioid pain medications: These medications can lead to problems such as addiction/dependence, sedation, lightheadedness/dizziness, memory issues, falls, constipation, nausea, or vomiting. They may also impair the ability to drive or operate machinery. Additionally, these medications may lower testosterone levels, leading to loss of bone strength, stamina and sex drive.   They may cause problems with breathing, sleep apnea and reduced coughing, which are especially dangerous for patients with lung disease. Overdose or dangerous interactions with alcohol and other medications may occur, leading to death. Hyperalgesia may develop, in which patients receiving opioids for the treatment of pain may actually become more sensitive to certain painful stimuli, and in some cases, experience pain from ordinarily non-painful stimuli. Women between the ages of 14-53 who could become pregnant should carefully weigh the risks and benefits of opioids with their physicians, as these medications increase the risk of pregnancy complications, including miscarriage,  delivery and stillbirth. It is also possible for babies to be born addicted to opioids. Opioid dependence withdrawal symptoms may include; feelings of uneasiness, increased pain, irritability, belly pain, diarrhea, sweats and goose-flesh. Benzodiazepines and non-benzodiazepine sleep medications: These medications can lead to problems such as addiction/dependence, sedation, fatigue, lightheadedness, dizziness, incoordination, falls, depression, hallucinations, and impaired judgment, memory and concentration. The ability to drive and operate machinery may also be affected. Abnormal sleep-related behaviors have been reported, including sleep walking, driving, making telephone calls, eating, or having sex while not fully awake. These medications can suppress breathing and worsen sleep apnea, particularly when combined with alcohol or other sedating medications, potentially leading to death. Dependence withdrawal symptoms may include tremors, anxiety, hallucinations and seizures. Stimulants:  Common adverse effects include addiction/dependence, increased blood pressure and heart rate, decreased appetite, nausea, involuntary weight loss, insomnia, irritability, and headaches.   These risks may increase when these medications are combined with other stimulants, such as caffeine pills or energy drinks, certain weight loss supplements and oral decongestants. Dependence withdrawal symptoms may include depressed mood, loss of interest, suicidal thoughts, anxiety, fatigue, appetite changes and agitation. Testosterone replacement therapy:  Potential side effects include increased risk of stroke and heart attack, blood clots, increased blood pressure, increased cholesterol, enlarged prostate, sleep apnea, irritability/aggression and other mood disorders, and decreased fertility. Other:     1. I understand that I have the following responsibilities:  · I will take medications at the dose and frequency prescribed. · I will not increase or change how I take my medications without the approval of the health care provider who signs this Medication Agreement. · I will arrange for refills at the prescribed interval ONLY during regular office hours. I will not ask for refills earlier than agreed, after-hours, on holidays or on weekends. · I will obtain all refills for these medications at  ·  ____________________________________  pharmacy (phone number  ·  ________________________), with full consent for my provider and pharmacist to exchange information in writing or verbally. · I will not request any pain medications or controlled substances from other providers and will inform this provider of all other medications I am taking. · I will inform my other health care providers that I am taking these medications and of the existence of this [address]. In the event of an emergency, I will provide the same information to the emergency department providers. · I will protect my prescriptions and medications. I understand that lost or misplaced prescriptions will not be replaced. · I will keep medications only for my own use and will not share them with others. I will keep all medications away from children. · I agree to participate in any medical, psychological or psychiatric assessments recommended by my provider. · I will actively participate in any program designed to improve function, including social, physical, psychological and daily or work activities. 2. I will not use illegal or street drugs or another person's prescription. If I have an addiction problem with drugs or alcohol and my provider asks me to enter a program to address this issue, I agree to follow through. Such programs may include:  · 12-Step program and securing a sponsor  · Individual counseling   · Inpatient or outpatient treatment  · Other:_____________________________________________________________________________________________________________________________________________    If in treatment, I will request that a copy of the programs initial evaluation and treatment recommendations be sent to this provider and will not expect refills until that is received. I will also request written monthly updates be sent to this provider to verify my continuing treatment. 3. I will consent to drug screening upon my providers request to assure I am only taking the prescribed drugs, described in this MEDICATION AGREEMENT. I understand that a drug screen is a laboratory test in which a sample of my urine, blood or saliva is checked to see what drugs I have been taking. 4. I agree that I will treat the providers and staff at this office with respect at all times. I will keep all of my scheduled appointments, but if I need to cancel my appointment, I will do so a minimum of 24 hours before it is scheduled. 5. I understand that this provider may stop prescribing the medications listed if:  · I do not show any improvement in pain, or my activity has not improved. · I develop rapid tolerance or loss of improvement, as described in my treatment plan. · I develop significant side effects from the medication.   · My behavior is inconsistent with the responsibilities outlined above, which may also result in my being prevented from receiving further care from this office. · Other:____________________________________________________________________    AGREEMENT:    I have read the above and have had all of my questions answered. For chronic disease management, I know that my symptoms can be managed with many types of treatments. A chronic medication trial may be part of my treatment, but I must be an active participant in my care. Medication therapy is only one part of my symptom management plan. In some cases, there may be limited scientific evidence to support the chronic use of certain medications to improve symptoms and daily function. Furthermore, in certain circumstances, there may be scientific information that suggests that use of chronic controlled substances may actually worsen my symptoms and increase my risk of unintentional death directly related to this medication therapy. I know that if my provider feels my risk from controlled medications is greater than my benefit, I will have my controlled substance medication(s) compassionately lowered or removed altogether. I agree to a controlled substance medication trial.      I further agree to allow this office to contact my HIPAA contact on file if there are concerns about my safety and use of controlled medications. I have agreed to use the following medications above as instructed by my physician and as stated in this [address].      Patient Signature:  ______________________  Date:4/25/2019 or _____________    Provider Signature:______________________  Date:4/25/2019 or _____________

## 2019-04-25 NOTE — PATIENT INSTRUCTIONS
RTC on 5/23/19. Nurse visit on 5/2/19   Medications e-scribe to pharmacy of pt's choice. Scripts for lab given to pt with fasting instructions, pt will get labs done before next appt. Order for Dexa Screen faxed to [facility] Scheduling they will call pt for appt. Please call [phone] in not heard within 2 weeks. Script for Mammogram and instructions for ANN given to pt, pt will call [phone] and schedule appt. An After Visit Summary was printed and given to the patient. CB    LABORATORY INSTRUCTIONS    Your doctor has ordered blood or urine testing. You can get this testing done at the Lab located on the first floor of the [facility], or at any other [facility]. Please stop at Main Registration, before going to the lab, as you must be registered first.     Please get this lab done before your next visit. You may NOT eat, drink, smoke, or chew anything before this test for 8-12 hours. You may still have water. MAMMOGRAM INSTRUCTIONS    Your physician has ordered a mammogram for you. Mammography is an X-ray that creates an image of the breast.     To prepare yourself for the test shower, or bathe, as usual, but do not use any deodorants, powders, or perfumes under or around the breast or chest area. You will be called by the Scheduling Department to schedule your testing. If you do not hear from them within a week, please call them at [phone] to schedule the appointment. This will be done at any [facility] location of your choice. Report to the Admitting Department 20 minutes before the test to complete the proper paperwork. If you cannot keep this appointment, please call [phone] to cancel and reschedule your appointment. TESTING INSTRUCTIONS    Your doctor has ordered a/an Dexa Scan for you, this will be done at any [facility] location of your choice    You will be called by the Scheduling Department to schedule your testing.  If you do not hear from them within a week, please call them at [phone] to schedule the appointment. On the day of your appointment, please report to the Admitting Department, located on the main floor of the medical center behind the information desk. If you cannot keep this appointment, please call [phone] to cancel and reschedule your appointment.

## 2019-04-25 NOTE — PROGRESS NOTES
DIABETES and HYPERTENSION visit    BP Readings from Last 3 Encounters:   01/16/19 133/87   12/13/18 (!) 158/88   12/05/18 133/74        Hemoglobin A1C (%)   Date Value   12/13/2018 6.3   05/16/2018 6.3 (H)   10/11/2017 6.3     Microalb/Crt. Ratio (mcg/mg creat)   Date Value   02/23/2018 CANNOT BE CALCULATED     LDL Cholesterol (mg/dL)   Date Value   05/16/2018 66     HDL (mg/dL)   Date Value   05/16/2018 66     BUN (mg/dL)   Date Value   02/23/2018 11     CREATININE (mg/dL)   Date Value   02/23/2018 0.45 (L)     Glucose (mg/dL)   Date Value   02/23/2018 86            Have you changed or started any medications since your last visit including any over-the-counter medicines, vitamins, or herbal medicines? no   Have you stopped taking any of your medications? Is so, why? -  no  Are you having any side effects from any of your medications? - no    Have you seen any other physician or provider since your last visit? yes    Have you had any other diagnostic tests since your last visit? yes    Have you been seen in the emergency room and/or had an admission in a hospital since we last saw you?  no   Have you had your routine dental cleaning in the past 6 months?  no     Have you had your annual diabetic retinal (eye) exam? No   (ensure copy of exam is in the chart)    Do you have an active [employer] account? If no, what is the barrier?   Yes    Patient Care Team:  Jovani Dos Santos MD as PCP - General    Medical History Review  Past Medical, Family, and Social History reviewed and does contribute to the patient presenting condition    Health Maintenance   Topic Date Due    Shingles Vaccine (1 of 2) 04/21/2004    Diabetic retinal exam  02/16/2019    Diabetic microalbuminuria test  02/23/2019    Potassium monitoring  02/23/2019    Creatinine monitoring  02/23/2019    Pneumococcal 65+ years Vaccine (1 of 2 - PCV13) 04/21/2019    DEXA (modify frequency per FRAX score)  04/21/2019    Lipid screen  05/16/2019    Diabetic foot exam  08/22/2019    A1C test (Diabetic or Prediabetic)  12/13/2019    Colon Cancer Screen FIT/FOBT  01/25/2020    Breast cancer screen  02/23/2020    DTaP/Tdap/Td vaccine (2 - Td) 07/18/2026    Flu vaccine  Completed    Hepatitis C screen  Completed    HIV screen  Completed

## 2019-04-25 NOTE — PROGRESS NOTES
Office Progress Note  Date of patient's visit: 4/25/2019  Patient's Name:  Caryle Grove YOB: 1955            ================================================================    REASON FOR VISIT/CHIEF COMPLAINT:  Hypertension and Diabetes      HISTORY OF PRESENTING ILLNESS:  [place] is here to follow-up on  Hypertension- her blood pressure is very high today  It is usually good. She is on lisinopril 30 mg and is compliant to the medication. She is currently very stressed because one of her family members passed away this morning. She denies any symptoms such as chest pain, headache, dizziness. She checks her blood pressure intermittently and home and says it runs good  Patient has chronic lower extremity edema which is stable. Echo in the past showed ejection fraction of 55% with some evidence of diastolic dysfunction  She is on Lipitor for dyslipidemia    Diabetes mellitus-A1c was 6.3 in December 2018  She is on metformin  She follows with podiatry    Has history of primary osteoarthritis of both knees she is on Mobic. I also prescribe tramadol 60 tablets per month    GERD-stable symptoms on Prilosec    She is postmenopausal, needs DEXA. Needs Prevnar 13, mammogram      Current Outpatient Medications   Medication Sig Dispense Refill    meloxicam (MOBIC) 15 MG tablet Take 1 tablet by mouth daily 90 tablet 0    traMADol (ULTRAM) 50 MG tablet Take 1 tablet by mouth 2 times daily as needed for Pain for up to 30 days. 60 tablet 0    clotrimazole-betamethasone (LOTRISONE) 1-0.05 % cream Apply topically 2 times daily.  1 Tube 3    lisinopril (PRINIVIL;ZESTRIL) 30 MG tablet Take 1 tablet by mouth daily 90 tablet 5    omeprazole (PRILOSEC) 20 MG delayed release capsule Take 1 capsule by mouth daily Take at supper time 90 capsule 3    albuterol sulfate HFA (PROVENTIL HFA) 108 (90 Base) MCG/ACT inhaler Inhale 2 puffs into the lungs every 6 hours as needed for Wheezing 3 Inhaler 3    atorvastatin (LIPITOR) 20 MG tablet Take 1 tablet by mouth daily 90 tablet 2    metFORMIN (GLUCOPHAGE) 500 MG tablet Take 1 tablet by mouth 2 times daily (with meals) 180 tablet 3    loratadine (CLARITIN) 10 MG tablet Take 1 tablet by mouth daily 90 tablet 3    fluticasone (FLONASE) 50 MCG/ACT nasal spray 1 spray by Nasal route daily 3 Bottle 3    glucose monitoring kit (FREESTYLE) monitoring kit 1 kit by Does not apply route daily 1 kit 0    blood glucose monitor strips 1 strip by Other route 2 times daily Check 2 times daily Diagnosis  strip 11    ammonium lactate (LAC-HYDRIN) 12 % lotion Apply topically daily. 1 Bottle 2    mineral oil-hydrophilic petrolatum (AQUAPHOR) ointment Apply topically as needed. 396 g 3    Handicap Placard MISC by Does not apply route Cannot walk more than 200 feet without stopping to rest  Duration 2 years 1 each 0    ammonium lactate (LAC-HYDRIN) 12 % lotion Apply topically daily. 1 Bottle 2    Glucose Blood (BLOOD GLUCOSE TEST STRIPS) STRP Test 3 times /day , Dx DM 2 100 strip 11    guaiFENesin (ALTARUSSIN) 100 MG/5ML syrup Take 10 mLs by mouth 2 times daily as needed for Cough 1 Bottle 3    Lancets MISC Test 3 times /day , Dx DM 2 100 each 3    Alcohol Swabs (ALCOHOL PADS) 70 % PADS Test 3 times /day , Dx DM 2 100 each 11    Emollient (EUCERIN INTENSIVE REPAIR) LOTN Apply 1 Dose topically daily 1 Bottle 3    Misc. Devices MISC 1 each by Does not apply route once for 1 dose 1 Device 0     Current Facility-Administered Medications   Medication Dose Route Frequency Provider Last Rate Last Dose    lidocaine-EPINEPHrine 2 %-1:732772 injection 3 mL  3 mL Intradermal Once Daniel Yang MD        lidocaine-EPINEPHrine 2 %-1:109754 injection 3 mL  3 mL Intradermal Once Daniel Yang MD               REVIEW OF SYSTEMS:  HENT: Negative for nosebleeds. Respiratory: Negative for  shortness of breath, wheezing and stridor.     Cardiovascular: Negative for chest pain, palpitations and leg swelling. Gastrointestinal: Negative for nausea, vomiting, abdominal pain, diarrhea and constipation. Genitourinary: Negative for hematuria. PHYSICAL EXAM:  Vitals:    04/25/19 1457 04/25/19 1516   BP: (!) 170/102 (!) 191/113   Site: Right Lower Arm Left Lower Arm   Position: Sitting Sitting   Cuff Size: Medium Adult Medium Adult   Pulse: 83 82   Weight: 250 lb (113.4 kg)        Constitutional: She is oriented to person, place, and time. She appears well-developed. No distress. Cardiovascular: Normal rate and regular rhythm. Pulmonary/Chest: Effort normal and breath sounds normal. No stridor. No respiratory distress. no wheezes. no rales. Abdominal: Soft. Bowel sounds are normal.  no distension. There is no tenderness. There is no rebound and no guarding. Musculoskeletal:  chronic LE edema present      DIAGNOSTIC FINDINGS:  CBC:  Lab Results   Component Value Date    WBC 9.6 02/23/2018    HGB 15.2 02/23/2018     02/23/2018       BMP:    Lab Results   Component Value Date     02/23/2018    K 4.4 02/23/2018     02/23/2018    CO2 28 02/23/2018    BUN 11 02/23/2018    CREATININE 0.45 02/23/2018    GLUCOSE 86 02/23/2018       HEMOGLOBIN A1C:   Lab Results   Component Value Date    LABA1C 6.3 12/13/2018       FASTING LIPID PANEL:  Lab Results   Component Value Date    CHOL 151 05/16/2018    HDL 66 05/16/2018    TRIG 94 05/16/2018       ASSESSMENT :  1. Post-menopausal    2. Primary osteoarthritis of both knees    3. Type 2 diabetes mellitus without complication, without long-term current use of insulin ([address]    4. Screening for breast cancer    5. Essential hypertension        PLAN:  1. DEXA  2. Continue Mobic, Tramadol  3. Continue metformin  4. Mammogram  5. She is stressed today and her blood pressure has been okay in the past hence will continue lisinopril 30 mg. Continue to monitor blood pressure at home.   Will have her come back in 1 week for a nurse visit for BP check. We have explained to her to go to the ER if she develops symptoms such as chest pain, headache, dizziness or any other new symptoms. She voiced understanding. FOLLOW UP AND INSTRUCTIONS:  Return in about 1 month (around 5/23/2019). Discussed use, benefit, and side effects of prescribed medications. Barriers to medication compliance addressed. All patient questions answered. Pt voiced understanding. [address] [place] [address] Internal Medicine Associate  4/25/2019, 3:50 PM    This note is created with the assistance of a speech-recognition program. While intending to generate a document that actually reflects the content of the visit, the document can still have some mistakes which may not have been identified and corrected by editing.

## 2019-05-15 ENCOUNTER — OFFICE VISIT (OUTPATIENT)
Dept: PODIATRY | Age: 64
End: 2019-05-15
Payer: MEDICARE

## 2019-05-15 VITALS
HEART RATE: 98 BPM | DIASTOLIC BLOOD PRESSURE: 87 MMHG | WEIGHT: 249 LBS | BODY MASS INDEX: 52.04 KG/M2 | SYSTOLIC BLOOD PRESSURE: 155 MMHG

## 2019-05-15 DIAGNOSIS — M72.2 PLANTAR FASCIITIS OF RIGHT FOOT: Primary | ICD-10-CM

## 2019-05-15 DIAGNOSIS — M79.672 FOOT PAIN, BILATERAL: ICD-10-CM

## 2019-05-15 DIAGNOSIS — B35.1 ONYCHOMYCOSIS: ICD-10-CM

## 2019-05-15 DIAGNOSIS — M79.671 FOOT PAIN, BILATERAL: ICD-10-CM

## 2019-05-15 PROCEDURE — 99212 OFFICE O/P EST SF 10 MIN: CPT | Performed by: STUDENT IN AN ORGANIZED HEALTH CARE EDUCATION/TRAINING PROGRAM

## 2019-05-15 PROCEDURE — 99213 OFFICE O/P EST LOW 20 MIN: CPT | Performed by: STUDENT IN AN ORGANIZED HEALTH CARE EDUCATION/TRAINING PROGRAM

## 2019-05-15 PROCEDURE — 11721 DEBRIDE NAIL 6 OR MORE: CPT | Performed by: STUDENT IN AN ORGANIZED HEALTH CARE EDUCATION/TRAINING PROGRAM

## 2019-05-15 NOTE — PROGRESS NOTES
Subjective:   Gold Martins is a 71 y.o. female who presents to the clinic followup of right heel pain. Patient states that her heel pain has resolved. She continues to perform stretches as instructed. Picked up orthotics and followed break in period. Notes nails difficulty to cut due to knee OA. Denies numbness, tingling, and burning in the hands and feet. Denies cramping in the calves with walking. Denies open wounds. Notes borderline diabetic    PCP is Jaylan Wheeler MD      Objective:   Physical Exam   Vitals:    05/15/19 1508   BP: (!) 155/87   Pulse:         Lab Results   Component Value Date    LABA1C 6.3 12/13/2018       Gen: AAOx3, NAD     Vascular: DP pulses are 2/4 palpable, bilateral. PT pulses are 1/4 palpable, bilateral. CFT is brisk to all digits. Skin temp is warm to warm proximal to distal, bilateral. Mild edema noted to bilateral ankles. No varicosities noted, bilateral.      Neurologic:  Epicritic sensation intact, Bilateral.      Dermatologic: Skin is noted to be warm dry and xerotic. Toenails 1-5 b/l are mildly elongated, thickened and discolored with subungual debris. No open wounds or interdigital maceration 1 through 4 bilateral.    Musculoskeletal: Muscle strength 5/5 for all LE muscle groups. Brachymetatarsal noted to right 4th. No TTP of plantar medial tubercle of right calcaneus and central band of plantar fascia. No palpable nodule in fascia. No pain with medial to lateral compression of the calcaneus. No ecchymosis. XR LEFT FOOT: 8/22/18   FINDINGS:   No acute fractures or dislocations are identified in the right foot.  Tarsal   metatarsal alignment is normal. No lytic or blastic lesions present in all   visualized osseous structures.  Short right 4th metatarsal.  Mild   degenerative changes of the 1st right metatarsophalangeal joint.       Moderate-sized enthesophyte formation at the calcaneal attachment of plantar   fascia.       No bony erosions seen.         Impression   Moderate-sized enthesophyte formation at the calcaneal attachment of plantar   fascia. Assessment:   Pt is a 71 y.o. female with      Diagnosis Orders   1. Plantar fasciitis of right foot     2. Onychomycosis     3. Foot pain, bilateral         Plan:      · Patient seen and evaluated. · Continue stretching for plantar fascitis as instructed at previous visit. · Continue applying cream to bilateral feet. · Educated on the importance of tight control of blood glucose levels for vascular, neurologic, and overall health. · Educated on the importance of daily foot exams and what to look for, including open sores, foreign bodies, and signs of infection. · Continue orthoses  · Return to clinic in 3 months  · Discussed with Dr. Susi Ford    No orders of the defined types were placed in this encounter. No orders of the defined types were placed in this encounter.     Electronically signed by Tiffany Lemus DPM on 5/15/2019 at 3:09 PM

## 2019-05-15 NOTE — PROGRESS NOTES
Patient instructed to remove shoes and socks, instructed to sit in exam chair. Current PCP name is Jono Spring and date of last visit 5/18/19. Do you have a follow up visit scheduled? Yes     If yes the date is 5/25/19. Diabetic visit information    Blood pressure (Control is BP <140/90)  BP Readings from Last 3 Encounters:   05/15/19 (!) 180/94   04/25/19 (!) 191/113   01/16/19 133/87        Blood Pressure  BP Readings from Last 3 Encounters:   05/15/19 (!) 180/94   04/25/19 (!) 191/113   01/16/19 133/87      []  If SBP >140 mmhg - refer to PCP for follow up   []  If DBP > 90 mmhg - refer to PCP for follow up   [] BP is controlled <140/90     Order labs as PCP ordered.   (ie: Lipids, A1C, CMP)

## 2019-05-20 ENCOUNTER — HOSPITAL ENCOUNTER (OUTPATIENT)
Age: 64
Discharge: HOME | End: 2019-05-20
Payer: MEDICARE

## 2019-05-20 ENCOUNTER — HOSPITAL ENCOUNTER (OUTPATIENT)
Dept: MAMMOGRAPHY | Age: 64
Discharge: HOME | End: 2019-05-22
Payer: MEDICARE

## 2019-05-20 DIAGNOSIS — E11.9 TYPE 2 DIABETES MELLITUS WITHOUT COMPLICATION, WITHOUT LONG-TERM CURRENT USE OF INSULIN (HCC): Chronic | ICD-10-CM

## 2019-05-20 DIAGNOSIS — Z12.39 SCREENING FOR BREAST CANCER: ICD-10-CM

## 2019-05-20 DIAGNOSIS — Z78.0 POST-MENOPAUSAL: ICD-10-CM

## 2019-05-20 LAB
ABSOLUTE EOS #: 0.38 K/UL (ref 0–0.44)
ABSOLUTE IMMATURE GRANULOCYTE: <0.03 K/UL (ref 0–0.3)
ABSOLUTE LYMPH #: 2.51 K/UL (ref 1.1–3.7)
ABSOLUTE MONO #: 0.58 K/UL (ref 0.1–1.2)
ALBUMIN SERPL-MCNC: 4.1 G/DL (ref 3.5–5.2)
ALBUMIN/GLOBULIN RATIO: 1.3 (ref 1–2.5)
ALP BLD-CCNC: 80 U/L (ref 35–104)
ALT SERPL-CCNC: 19 U/L (ref 5–33)
ANION GAP SERPL CALCULATED.3IONS-SCNC: 13 MMOL/L (ref 9–17)
AST SERPL-CCNC: 19 U/L
BASOPHILS # BLD: 1 % (ref 0–2)
BASOPHILS ABSOLUTE: 0.07 K/UL (ref 0–0.2)
BILIRUB SERPL-MCNC: 0.72 MG/DL (ref 0.3–1.2)
BUN BLDV-MCNC: 19 MG/DL (ref 8–23)
BUN/CREAT BLD: NORMAL (ref 9–20)
CALCIUM SERPL-MCNC: 10.2 MG/DL (ref 8.6–10.4)
CHLORIDE BLD-SCNC: 102 MMOL/L (ref 98–107)
CHOLESTEROL/HDL RATIO: 2.9
CHOLESTEROL: 154 MG/DL
CO2: 25 MMOL/L (ref 20–31)
CREAT SERPL-MCNC: 0.58 MG/DL (ref 0.5–0.9)
CREATININE URINE: 175.4 MG/DL (ref 28–217)
DIFFERENTIAL TYPE: ABNORMAL
EOSINOPHILS RELATIVE PERCENT: 4 % (ref 1–4)
GFR AFRICAN AMERICAN: >60 ML/MIN
GFR NON-AFRICAN AMERICAN: >60 ML/MIN
GFR SERPL CREATININE-BSD FRML MDRD: NORMAL ML/MIN/{1.73_M2}
GFR SERPL CREATININE-BSD FRML MDRD: NORMAL ML/MIN/{1.73_M2}
GLUCOSE BLD-MCNC: 98 MG/DL (ref 70–99)
HCT VFR BLD CALC: 49.4 % (ref 36.3–47.1)
HDLC SERPL-MCNC: 54 MG/DL
HEMOGLOBIN: 14.3 G/DL (ref 11.9–15.1)
IMMATURE GRANULOCYTES: 0 %
LDL CHOLESTEROL: 81 MG/DL (ref 0–130)
LYMPHOCYTES # BLD: 29 % (ref 24–43)
MCH RBC QN AUTO: 25.6 PG (ref 25.2–33.5)
MCHC RBC AUTO-ENTMCNC: 28.9 G/DL (ref 28.4–34.8)
MCV RBC AUTO: 88.4 FL (ref 82.6–102.9)
MICROALBUMIN/CREAT 24H UR: 16 MG/L
MICROALBUMIN/CREAT UR-RTO: 9 MCG/MG CREAT
MONOCYTES # BLD: 7 % (ref 3–12)
NRBC AUTOMATED: 0 PER 100 WBC
PDW BLD-RTO: 15.2 % (ref 11.8–14.4)
PLATELET # BLD: 256 K/UL (ref 138–453)
PLATELET ESTIMATE: ABNORMAL
PMV BLD AUTO: 11.4 FL (ref 8.1–13.5)
POTASSIUM SERPL-SCNC: 4.2 MMOL/L (ref 3.7–5.3)
RBC # BLD: 5.59 M/UL (ref 3.95–5.11)
RBC # BLD: ABNORMAL 10*6/UL
SEG NEUTROPHILS: 59 % (ref 36–65)
SEGMENTED NEUTROPHILS ABSOLUTE COUNT: 5.03 K/UL (ref 1.5–8.1)
SODIUM BLD-SCNC: 140 MMOL/L (ref 135–144)
TOTAL PROTEIN: 7.3 G/DL (ref 6.4–8.3)
TRIGL SERPL-MCNC: 96 MG/DL
VLDLC SERPL CALC-MCNC: NORMAL MG/DL (ref 1–30)
WBC # BLD: 8.6 K/UL (ref 3.5–11.3)
WBC # BLD: ABNORMAL 10*3/UL

## 2019-05-20 PROCEDURE — 80053 COMPREHEN METABOLIC PANEL: CPT

## 2019-05-20 PROCEDURE — 80061 LIPID PANEL: CPT

## 2019-05-20 PROCEDURE — 77063 BREAST TOMOSYNTHESIS BI: CPT

## 2019-05-20 PROCEDURE — 77080 DXA BONE DENSITY AXIAL: CPT

## 2019-05-20 PROCEDURE — 82570 ASSAY OF URINE CREATININE: CPT

## 2019-05-20 PROCEDURE — 85025 COMPLETE CBC W/AUTO DIFF WBC: CPT

## 2019-05-20 PROCEDURE — 36415 COLL VENOUS BLD VENIPUNCTURE: CPT

## 2019-05-20 PROCEDURE — 82043 UR ALBUMIN QUANTITATIVE: CPT

## 2019-05-23 ENCOUNTER — OFFICE VISIT (OUTPATIENT)
Dept: INTERNAL MEDICINE | Age: 64
End: 2019-05-23
Payer: MEDICARE

## 2019-05-23 VITALS
BODY MASS INDEX: 52.06 KG/M2 | HEART RATE: 105 BPM | SYSTOLIC BLOOD PRESSURE: 160 MMHG | DIASTOLIC BLOOD PRESSURE: 93 MMHG | HEIGHT: 58 IN | WEIGHT: 248 LBS

## 2019-05-23 DIAGNOSIS — I10 ESSENTIAL HYPERTENSION: ICD-10-CM

## 2019-05-23 DIAGNOSIS — M17.0 PRIMARY OSTEOARTHRITIS OF BOTH KNEES: ICD-10-CM

## 2019-05-23 DIAGNOSIS — E11.9 TYPE 2 DIABETES MELLITUS WITHOUT COMPLICATION, WITHOUT LONG-TERM CURRENT USE OF INSULIN (HCC): Primary | Chronic | ICD-10-CM

## 2019-05-23 PROCEDURE — 99213 OFFICE O/P EST LOW 20 MIN: CPT | Performed by: INTERNAL MEDICINE

## 2019-05-23 PROCEDURE — 99211 OFF/OP EST MAY X REQ PHY/QHP: CPT | Performed by: INTERNAL MEDICINE

## 2019-05-23 RX ORDER — TRAMADOL HYDROCHLORIDE 50 MG/1
50 TABLET ORAL 2 TIMES DAILY PRN
Qty: 60 TABLET | Refills: 0 | Status: SHIPPED | OUTPATIENT
Start: 2019-05-23 | End: 2019-06-24 | Stop reason: SDUPTHER

## 2019-05-23 RX ORDER — LISINOPRIL 40 MG/1
40 TABLET ORAL DAILY
Qty: 90 TABLET | Refills: 1 | Status: SHIPPED | OUTPATIENT
Start: 2019-05-23 | End: 2019-11-22 | Stop reason: SDUPTHER

## 2019-05-23 RX ORDER — BLOOD PRESSURE TEST KIT
1 KIT MISCELLANEOUS DAILY
Qty: 1 KIT | Refills: 0 | Status: SHIPPED | OUTPATIENT
Start: 2019-05-23

## 2019-05-23 RX ORDER — LORATADINE 10 MG/1
10 TABLET ORAL DAILY
Qty: 90 TABLET | Refills: 3 | Status: SHIPPED | OUTPATIENT
Start: 2019-05-23

## 2019-05-23 NOTE — PROGRESS NOTES
Office Progress Note  Date of patient's visit: 5/23/2019  Patient's Name:  Juan Hernandez YOB: 1955            ================================================================    REASON FOR VISIT/CHIEF COMPLAINT:  Hypertension (pt. needs a new BP monitor) and Diabetes      HISTORY OF PRESENTING ILLNESS:  Patient is here to follow-up on  Hypertension-blood pressure was high last visit, it is better today but still above goal.  Blood pressure has been high for the last 5 months. She is on lisinopril 30 mg he did she does not want to take another medication but is okay with increasing the dose of lisinopril. She also needs another blood pressure monitor  Last visit, she was very stressed because of death of a family member but is doing better now  Has h/o chronic LE edema which is stable  ECHO in the past had shown EF of 55%    Diabetes mellitus-A1c was 6.3 in December 2018. She is on metformin  She is also on Lipitor 20 mg    She has history of primary osteoarthritis of both knees for which she is on meloxicam.  Also receives tramadol, 60 tablets per month from     History of allergic rhinitis, she is on Flonase and loratadine        Current Outpatient Medications   Medication Sig Dispense Refill    lisinopril (PRINIVIL;ZESTRIL) 40 MG tablet Take 1 tablet by mouth daily 90 tablet 1    Blood Pressure KIT 1 each by Does not apply route daily 1 kit 0    loratadine (CLARITIN) 10 MG tablet Take 1 tablet by mouth daily 90 tablet 3    traMADol (ULTRAM) 50 MG tablet Take 1 tablet by mouth 2 times daily as needed for Pain for up to 30 days. 60 tablet 0    meloxicam (MOBIC) 15 MG tablet Take 1 tablet by mouth daily 90 tablet 0    clotrimazole-betamethasone (LOTRISONE) 1-0.05 % cream Apply topically 2 times daily.  1 Tube 3    omeprazole (PRILOSEC) 20 MG delayed release capsule Take 1 capsule by mouth daily Take at supper time 90 capsule 3    albuterol sulfate HFA (PROVENTIL HFA) 108 (90 Base) MCG/ACT inhaler Inhale 2 puffs into the lungs every 6 hours as needed for Wheezing 3 Inhaler 3    atorvastatin (LIPITOR) 20 MG tablet Take 1 tablet by mouth daily 90 tablet 2    metFORMIN (GLUCOPHAGE) 500 MG tablet Take 1 tablet by mouth 2 times daily (with meals) 180 tablet 3    fluticasone (FLONASE) 50 MCG/ACT nasal spray 1 spray by Nasal route daily 3 Bottle 3    blood glucose monitor strips 1 strip by Other route 2 times daily Check 2 times daily Diagnosis  strip 11    ammonium lactate (LAC-HYDRIN) 12 % lotion Apply topically daily. 1 Bottle 2    Glucose Blood (BLOOD GLUCOSE TEST STRIPS) STRP Test 3 times /day , Dx DM 2 100 strip 11    guaiFENesin (ALTARUSSIN) 100 MG/5ML syrup Take 10 mLs by mouth 2 times daily as needed for Cough 1 Bottle 3    Lancets MISC Test 3 times /day , Dx DM 2 100 each 3    Alcohol Swabs (ALCOHOL PADS) 70 % PADS Test 3 times /day , Dx DM 2 100 each 11    Emollient (EUCERIN INTENSIVE REPAIR) LOTN Apply 1 Dose topically daily 1 Bottle 3    Misc. Devices MISC 1 each by Does not apply route once for 1 dose 1 Device 0    mineral oil-hydrophilic petrolatum (AQUAPHOR) ointment Apply topically as needed. 396 g 3     Current Facility-Administered Medications   Medication Dose Route Frequency Provider Last Rate Last Dose    lidocaine-EPINEPHrine 2 %-1:777535 injection 3 mL  3 mL Intradermal Once Buck Been, MD        lidocaine-EPINEPHrine 2 %-1:212401 injection 3 mL  3 mL Intradermal Once Buck Been, MD               REVIEW OF SYSTEMS:  HENT: Negative for nosebleeds. Respiratory: Negative for cough, shortness of breath, wheezing and stridor. Cardiovascular: Negative for chest pain, palpitations and leg swelling. Gastrointestinal: Negative for nausea, vomiting, abdominal pain, diarrhea and constipation.        PHYSICAL EXAM:  Vitals:    05/23/19 1452 05/23/19 1517   BP: (!) 157/87 (!) 160/93   Site: Left Upper Arm    Position: Sitting    Cuff Size: Large Adult Pulse: 105    Weight: 248 lb (112.5 kg)    Height: 4' 10\" (1.473 m)        Constitutional: She is oriented to person, place, and time. She appears well-developed. No distress. Cardiovascular: Normal rate and regular rhythm. Pulmonary/Chest: Effort normal and breath sounds normal. No stridor. No respiratory distress. no wheezes. no rales. Abdominal: Soft. Bowel sounds are normal.  no distension. There is no tenderness. There is no rebound and no guarding. Musculoskeletal:  Leg edema much improved        DIAGNOSTIC FINDINGS:  CBC:  Lab Results   Component Value Date    WBC 8.6 05/20/2019    HGB 14.3 05/20/2019     05/20/2019       BMP:    Lab Results   Component Value Date     05/20/2019    K 4.2 05/20/2019     05/20/2019    CO2 25 05/20/2019    BUN 19 05/20/2019    CREATININE 0.58 05/20/2019    GLUCOSE 98 05/20/2019       HEMOGLOBIN A1C:   Lab Results   Component Value Date    LABA1C 6.3 12/13/2018       FASTING LIPID PANEL:  Lab Results   Component Value Date    CHOL 154 05/20/2019    HDL 54 05/20/2019    TRIG 96 05/20/2019       ASSESSMENT :  1. Type 2 diabetes mellitus without complication, without long-term current use of insulin ([facility] [address]    2. Essential hypertension    3. Primary osteoarthritis of both knees        PLAN:  1. Continue metformin  2. Increase Lisinopril to 40 mg. Will order a new BP kit  BMP next visit  3. Refill Tramadol        FOLLOW UP AND INSTRUCTIONS:  · Return in about 1 month (around 6/20/2019). Discussed use, benefit, and side effects of prescribed medications. Barriers to medication compliance addressed. All patient questions answered. Pt voiced understanding.          [facility] Internal Medicine Associate  5/23/2019, 3:26 PM    This note is created with the assistance of a speech-recognition program. While intending to generate a document that actually reflects the content of the visit, the

## 2019-05-23 NOTE — PROGRESS NOTES
DIABETES and HYPERTENSION visit    BP Readings from Last 3 Encounters:   05/15/19 (!) 155/87   04/25/19 (!) 191/113   01/16/19 133/87        Hemoglobin A1C (%)   Date Value   12/13/2018 6.3   05/16/2018 6.3 (H)   10/11/2017 6.3     Microalb/Crt. Ratio (mcg/mg creat)   Date Value   05/20/2019 9     LDL Cholesterol (mg/dL)   Date Value   05/20/2019 81     HDL (mg/dL)   Date Value   05/20/2019 54     BUN (mg/dL)   Date Value   05/20/2019 19     CREATININE (mg/dL)   Date Value   05/20/2019 0.58     Glucose (mg/dL)   Date Value   05/20/2019 98            Have you changed or started any medications since your last visit including any over-the-counter medicines, vitamins, or herbal medicines? no   Have you stopped taking any of your medications? Is so, why? -  no  Are you having any side effects from any of your medications? - no    Have you seen any other physician or provider since your last visit? yes - Podiatry   Have you had any other diagnostic tests since your last visit? yes - DEXA, Mammogram, labs   Have you been seen in the emergency room and/or had an admission in a hospital since we last saw you?  no   Have you had your routine dental cleaning in the past 6 months?  no     Have you had your annual diabetic retinal (eye) exam? No   (ensure copy of exam is in the chart)    Do you have an active MyChart account? If no, what is the barrier?   Yes    Patient Care Team:  Jaylan Wheeler MD as PCP - General    Medical History Review  Past Medical, Family, and Social History reviewed and does not contribute to the patient presenting condition    Health Maintenance   Topic Date Due    Shingles Vaccine (1 of 2) 04/21/2004    Diabetic retinal exam  02/16/2019    Diabetic foot exam  08/22/2019    A1C test (Diabetic or Prediabetic)  12/13/2019    Colon Cancer Screen FIT/FOBT  01/25/2020    Diabetic microalbuminuria test  05/20/2020    Lipid screen  05/20/2020    Potassium monitoring  05/20/2020    Creatinine monitoring  05/20/2020    Breast cancer screen  05/20/2021    Pneumococcal 65+ years Vaccine (2 of 2 - PPSV23) 07/18/2021    DTaP/Tdap/Td vaccine (2 - Td) 07/18/2026    DEXA (modify frequency per FRAX score)  Completed    Flu vaccine  Completed    Hepatitis C screen  Completed    HIV screen  Completed

## 2019-06-24 ENCOUNTER — HOSPITAL ENCOUNTER (OUTPATIENT)
Age: 64
Setting detail: SPECIMEN
Discharge: HOME | End: 2019-06-24
Payer: MEDICARE

## 2019-06-24 ENCOUNTER — OFFICE VISIT (OUTPATIENT)
Dept: INTERNAL MEDICINE | Age: 64
End: 2019-06-24
Payer: MEDICARE

## 2019-06-24 VITALS
HEART RATE: 96 BPM | BODY MASS INDEX: 52.9 KG/M2 | HEIGHT: 58 IN | WEIGHT: 252 LBS | SYSTOLIC BLOOD PRESSURE: 139 MMHG | DIASTOLIC BLOOD PRESSURE: 86 MMHG

## 2019-06-24 DIAGNOSIS — I10 ESSENTIAL HYPERTENSION: Primary | ICD-10-CM

## 2019-06-24 DIAGNOSIS — M17.0 PRIMARY OSTEOARTHRITIS OF BOTH KNEES: ICD-10-CM

## 2019-06-24 DIAGNOSIS — E11.9 TYPE 2 DIABETES MELLITUS WITHOUT COMPLICATION, WITHOUT LONG-TERM CURRENT USE OF INSULIN (HCC): Chronic | ICD-10-CM

## 2019-06-24 LAB
ANION GAP SERPL CALCULATED.3IONS-SCNC: 17 MMOL/L (ref 9–17)
BUN BLDV-MCNC: 24 MG/DL (ref 8–23)
BUN/CREAT BLD: ABNORMAL (ref 9–20)
CALCIUM SERPL-MCNC: 10 MG/DL (ref 8.6–10.4)
CHLORIDE BLD-SCNC: 107 MMOL/L (ref 98–107)
CO2: 21 MMOL/L (ref 20–31)
CREAT SERPL-MCNC: 0.54 MG/DL (ref 0.5–0.9)
GFR AFRICAN AMERICAN: >60 ML/MIN
GFR NON-AFRICAN AMERICAN: >60 ML/MIN
GFR SERPL CREATININE-BSD FRML MDRD: ABNORMAL ML/MIN/{1.73_M2}
GFR SERPL CREATININE-BSD FRML MDRD: ABNORMAL ML/MIN/{1.73_M2}
GLUCOSE BLD-MCNC: 75 MG/DL (ref 70–99)
POTASSIUM SERPL-SCNC: 4.5 MMOL/L (ref 3.7–5.3)
SODIUM BLD-SCNC: 145 MMOL/L (ref 135–144)

## 2019-06-24 PROCEDURE — 99211 OFF/OP EST MAY X REQ PHY/QHP: CPT | Performed by: INTERNAL MEDICINE

## 2019-06-24 PROCEDURE — 36415 COLL VENOUS BLD VENIPUNCTURE: CPT

## 2019-06-24 PROCEDURE — 99213 OFFICE O/P EST LOW 20 MIN: CPT | Performed by: INTERNAL MEDICINE

## 2019-06-24 PROCEDURE — 80048 BASIC METABOLIC PNL TOTAL CA: CPT

## 2019-06-24 RX ORDER — TRAMADOL HYDROCHLORIDE 50 MG/1
50 TABLET ORAL 2 TIMES DAILY PRN
Qty: 60 TABLET | Refills: 0 | Status: SHIPPED | OUTPATIENT
Start: 2019-06-24 | End: 2019-07-24

## 2019-06-24 NOTE — PROGRESS NOTES
Office Progress Note  Date of patient's visit: 6/24/2019  Patient's Name:  Delilah Lozoya YOB: 1955            ================================================================    REASON FOR VISIT/CHIEF COMPLAINT:  1 Month Follow-Up and Hypertension      HISTORY OF PRESENTING ILLNESS:  Allyson Butt is here to follow-up on  Hypertension-last visit, I had increased the lisinopril to 40 mg  Blood pressure is much improved today  She feels well and denies any complaints  Has history of chronic lower extremity edema which is stable, echo in the past has shown ejection fraction of  55%    Diabetes mellitus. She is on metformin. Compliant with the medication  A1C 6.3 12/18  Dyslipidemia-on Lipitor 20 mg    She has history of primary osteoarthritis of multiple joints and is on meloxicam.  She also gets 60 tablets of tramadol per month from our office  She uses a cane for ambulation    She uses Flonase and loratadine for allergic rhinitis. Symptoms stable    She does not smoke anymore      Current Outpatient Medications   Medication Sig Dispense Refill    traMADol (ULTRAM) 50 MG tablet Take 1 tablet by mouth 2 times daily as needed for Pain for up to 30 days. 60 tablet 0    lisinopril (PRINIVIL;ZESTRIL) 40 MG tablet Take 1 tablet by mouth daily 90 tablet 1    Blood Pressure KIT 1 each by Does not apply route daily 1 kit 0    loratadine (CLARITIN) 10 MG tablet Take 1 tablet by mouth daily 90 tablet 3    meloxicam (MOBIC) 15 MG tablet Take 1 tablet by mouth daily 90 tablet 0    clotrimazole-betamethasone (LOTRISONE) 1-0.05 % cream Apply topically 2 times daily.  1 Tube 3    omeprazole (PRILOSEC) 20 MG delayed release capsule Take 1 capsule by mouth daily Take at supper time 90 capsule 3    albuterol sulfate HFA (PROVENTIL HFA) 108 (90 Base) MCG/ACT inhaler Inhale 2 puffs into the lungs every 6 hours as needed for Wheezing 3 Inhaler 3    atorvastatin (LIPITOR) 20 MG tablet Take 1 tablet by mouth lower extremity edema present    DIAGNOSTIC FINDINGS:  CBC:  Lab Results   Component Value Date    WBC 8.6 05/20/2019    HGB 14.3 05/20/2019     05/20/2019       BMP:    Lab Results   Component Value Date     05/20/2019    K 4.2 05/20/2019     05/20/2019    CO2 25 05/20/2019    BUN 19 05/20/2019    CREATININE 0.58 05/20/2019    GLUCOSE 98 05/20/2019       HEMOGLOBIN A1C:   Lab Results   Component Value Date    LABA1C 6.3 12/13/2018       FASTING LIPID PANEL:  Lab Results   Component Value Date    CHOL 154 05/20/2019    HDL 54 05/20/2019    TRIG 96 05/20/2019       ASSESSMENT :  1. Essential hypertension    2. Primary osteoarthritis of both knees    3. Type 2 diabetes mellitus without complication, without long-term current use of insulin ([facility] [address]        PLAN:  1. Continue lisinopril 40 mg. Check BMP  2. Continue Mobic, tramadol as needed  3. Continue metformin        FOLLOW UP AND INSTRUCTIONS:  · Return in about 3 months (around 9/24/2019). Discussed use, benefit, and side effects of prescribed medications. Barriers to medication compliance addressed. All patient questions answered. Pt voiced understanding. [address] [place] [address] Internal Medicine Associate  6/24/2019, 3:41 PM    This note is created with the assistance of a speech-recognition program. While intending to generate a document that actually reflects the content of the visit, the document can still have some mistakes which may not have been identified and corrected by editing.

## 2019-06-24 NOTE — PROGRESS NOTES
HYPERTENSION visit     BP Readings from Last 3 Encounters:   05/23/19 (!) 160/93   05/15/19 (!) 155/87   04/25/19 (!) 191/113       LDL Cholesterol (mg/dL)   Date Value   05/20/2019 81     HDL (mg/dL)   Date Value   05/20/2019 54     BUN (mg/dL)   Date Value   05/20/2019 19     CREATININE (mg/dL)   Date Value   05/20/2019 0.58     Glucose (mg/dL)   Date Value   05/20/2019 98              Have you changed or started any medications since your last visit including any over-the-counter medicines, vitamins, or herbal medicines? no   Have you stopped taking any of your medications? Is so, why? -  no  Are you having any side effects from any of your medications? - no  How often do you miss doses of your medication? rare      Have you seen any other physician or provider since your last visit?  no   Have you had any other diagnostic tests since your last visit?  no   Have you been seen in the emergency room and/or had an admission in a hospital since we last saw you?  no   Have you had your routine dental cleaning in the past 6 months?  no     Do you have an active MyChart account? If no, what is the barrier?   Yes    Patient Care Team:  Mauricio Hunt MD as PCP - Scooter Ricks MD as PCP - [facility] Provider    Medical History Review  Past Medical, Family, and Social History reviewed and does contribute to the patient presenting condition    Health Maintenance   Topic Date Due    Shingles Vaccine (1 of 2) 04/21/2004    Annual Wellness Visit (AWV)  04/21/2017    Diabetic retinal exam  02/16/2019    Diabetic foot exam  08/22/2019    A1C test (Diabetic or Prediabetic)  12/13/2019    Colon Cancer Screen FIT/FOBT  01/25/2020    Diabetic microalbuminuria test  05/20/2020    Lipid screen  05/20/2020    Potassium monitoring  05/20/2020    Creatinine monitoring  05/20/2020    Breast cancer screen  05/20/2021    Pneumococcal 65+ years Vaccine (2 of 2 - PPSV23) 07/18/2021    DTaP/Tdap/Td vaccine (2 - Td) 07/18/2026    DEXA (modify frequency per FRAX score)  Completed    Flu vaccine  Completed    Hepatitis C screen  Completed    HIV screen  Completed

## 2019-06-24 NOTE — PATIENT INSTRUCTIONS
LABORATORY INSTRUCTIONS    Your doctor has ordered blood or urine testing. You can get this testing done at the Lab located on the first floor of the [facility], or at any other [facility]. Please stop at Main Registration, before going to the lab, as you must be registered first.     Please get this lab done before your next visit. You may eat or drink before this test.         We will call you to schedule your 3 month follow up appointment. Please return to the clinic as needed. After Visit Summary  given and reviewed. --MA    It is very important for your care that you keep your appointment. If for some reason you are unable to keep your appointment it is equally important that you call our office at [phone] to cancel your appointment and reschedule. Failure to do so may result in your termination from our practice.

## 2019-07-24 DIAGNOSIS — M17.0 PRIMARY OSTEOARTHRITIS OF BOTH KNEES: ICD-10-CM

## 2019-07-25 RX ORDER — MELOXICAM 15 MG/1
15 TABLET ORAL DAILY
Qty: 90 TABLET | Refills: 0 | Status: SHIPPED | OUTPATIENT
Start: 2019-07-25 | End: 2021-01-14 | Stop reason: ALTCHOICE

## 2019-08-21 ENCOUNTER — OFFICE VISIT (OUTPATIENT)
Dept: PODIATRY | Age: 64
End: 2019-08-21
Payer: MEDICARE

## 2019-08-21 VITALS
HEIGHT: 58 IN | SYSTOLIC BLOOD PRESSURE: 131 MMHG | DIASTOLIC BLOOD PRESSURE: 71 MMHG | HEART RATE: 91 BPM | WEIGHT: 251.32 LBS | BODY MASS INDEX: 52.76 KG/M2

## 2019-08-21 DIAGNOSIS — E11.42 CONTROLLED TYPE 2 DIABETES MELLITUS WITH DIABETIC POLYNEUROPATHY, WITHOUT LONG-TERM CURRENT USE OF INSULIN (HCC): ICD-10-CM

## 2019-08-21 DIAGNOSIS — M79.671 FOOT PAIN, BILATERAL: Primary | ICD-10-CM

## 2019-08-21 DIAGNOSIS — L84 CALLUS OF FOOT: ICD-10-CM

## 2019-08-21 DIAGNOSIS — M79.672 FOOT PAIN, BILATERAL: Primary | ICD-10-CM

## 2019-08-21 DIAGNOSIS — B35.1 ONYCHOMYCOSIS: ICD-10-CM

## 2019-08-21 DIAGNOSIS — M72.2 PLANTAR FASCIITIS OF RIGHT FOOT: ICD-10-CM

## 2019-08-21 PROCEDURE — 11055 PARING/CUTG B9 HYPRKER LES 1: CPT | Performed by: STUDENT IN AN ORGANIZED HEALTH CARE EDUCATION/TRAINING PROGRAM

## 2019-08-21 PROCEDURE — 99212 OFFICE O/P EST SF 10 MIN: CPT | Performed by: STUDENT IN AN ORGANIZED HEALTH CARE EDUCATION/TRAINING PROGRAM

## 2019-08-21 PROCEDURE — 99213 OFFICE O/P EST LOW 20 MIN: CPT | Performed by: STUDENT IN AN ORGANIZED HEALTH CARE EDUCATION/TRAINING PROGRAM

## 2019-08-21 PROCEDURE — 11721 DEBRIDE NAIL 6 OR MORE: CPT | Performed by: STUDENT IN AN ORGANIZED HEALTH CARE EDUCATION/TRAINING PROGRAM

## 2019-08-21 NOTE — PROGRESS NOTES
Patient instructed to remove shoes and socks, instructed to sit in exam chair. Current PCP name is  and date of last visit 6/24/19. Do you have a follow up visit scheduled? No    Diabetic visit information    Blood pressure (Control is BP <140/90)  BP Readings from Last 3 Encounters:   06/24/19 139/86   05/23/19 (!) 160/93   05/15/19 (!) 155/87       BP taken with correct size cuff? - Yes   Repeated if > 140/90 No      Tobacco use:  Patient  reports that she quit smoking about 12 years ago. Her smoking use included cigarettes. She has a 7.50 pack-year smoking history. She has quit using smokeless tobacco.  If Smoker - Cessation materials given?- No       Diabetic Health Maintenance Items due  Diabetes Management   Topic Date Due    Diabetic retinal exam  02/16/2019    Diabetic foot exam  08/22/2019       Diabetic retinal exam done in last year? - Yes   If No: remind patient that it is due and they should schedule an exam    Medications  Is patient taking any medications for diabetes? -   Yes  Have blood sugars been controlled? Fasting blood sugars under 120   -   Yes   Random home sugars or today's POCT glucose is under 180 -   Yes   []  If No to the above then patient should schedule appt with PCP.      Diabetic Plan    A1C Plan  Lab Results   Component Value Date    LABA1C 6.3 12/13/2018    LABA1C 6.3 (H) 05/16/2018    LABA1C 6.3 10/11/2017      []  If A1C over 8 and last result >3 months ago - Order A1C and refer to PCP   []  If last A1C over 6 months ago - Order A1C and refer to PCP for follow up   []  If elevated blood sugars > 180 - refer to PCP for follow up    []  Blood sugar controlled - A1C under 8 and last check was < 6 months      Cholesterol Plan   Lab Results   Component Value Date    LDLCHOLESTEROL 81 05/20/2019      []  If LDL > 100 and last result >3 months ago - order Fasting lipids and refer to PCP for follow up   []  If LDL < 100 and over 1 year ago - Order Fasting lipids and refer to PCP for follow up   [] LDL is controlled. LDL < 100 and checked within the last year     Blood Pressure  BP Readings from Last 3 Encounters:   06/24/19 139/86   05/23/19 (!) 160/93   05/15/19 (!) 155/87      []  If SBP >140 mmhg - refer to PCP for follow up   []  If DBP > 90 mmhg - refer to PCP for follow up   [] BP is controlled <140/90     Order labs as PCP ordered.   (ie: Lipids, A1C, CMP)

## 2019-09-09 RX ORDER — ATORVASTATIN CALCIUM 20 MG/1
20 TABLET, FILM COATED ORAL DAILY
Qty: 90 TABLET | Refills: 2 | Status: SHIPPED | OUTPATIENT
Start: 2019-09-09

## 2019-09-23 DIAGNOSIS — M17.0 PRIMARY OSTEOARTHRITIS OF BOTH KNEES: Primary | ICD-10-CM

## 2019-09-24 DIAGNOSIS — M17.0 PRIMARY OSTEOARTHRITIS OF BOTH KNEES: ICD-10-CM

## 2019-09-24 RX ORDER — MELOXICAM 15 MG/1
15 TABLET ORAL DAILY
Qty: 90 TABLET | Refills: 0 | OUTPATIENT
Start: 2019-09-24

## 2019-09-30 ENCOUNTER — OFFICE VISIT (OUTPATIENT)
Dept: ORTHOPEDIC SURGERY | Age: 64
End: 2019-09-30
Payer: MEDICARE

## 2019-09-30 VITALS — HEIGHT: 58 IN | BODY MASS INDEX: 52.76 KG/M2 | WEIGHT: 251.32 LBS

## 2019-09-30 DIAGNOSIS — M17.0 PRIMARY OSTEOARTHRITIS OF BOTH KNEES: Primary | ICD-10-CM

## 2019-09-30 PROCEDURE — 99213 OFFICE O/P EST LOW 20 MIN: CPT | Performed by: ORTHOPAEDIC SURGERY

## 2019-10-16 ENCOUNTER — HOSPITAL ENCOUNTER (OUTPATIENT)
Dept: PHYSICAL THERAPY | Age: 64
Setting detail: THERAPIES SERIES
Discharge: HOME | End: 2019-10-16
Payer: MEDICARE

## 2019-10-16 PROCEDURE — 97161 PT EVAL LOW COMPLEX 20 MIN: CPT

## 2019-10-16 PROCEDURE — 97110 THERAPEUTIC EXERCISES: CPT

## 2019-10-24 ENCOUNTER — HOSPITAL ENCOUNTER (OUTPATIENT)
Dept: PHYSICAL THERAPY | Age: 64
Setting detail: THERAPIES SERIES
Discharge: HOME | End: 2019-10-24
Payer: MEDICARE

## 2019-10-24 PROCEDURE — 97110 THERAPEUTIC EXERCISES: CPT

## 2019-11-07 ENCOUNTER — HOSPITAL ENCOUNTER (OUTPATIENT)
Dept: PHYSICAL THERAPY | Age: 64
Setting detail: THERAPIES SERIES
Discharge: HOME | End: 2019-11-07
Payer: MEDICARE

## 2019-11-13 ENCOUNTER — OFFICE VISIT (OUTPATIENT)
Dept: PODIATRY | Age: 64
End: 2019-11-13
Payer: MEDICARE

## 2019-11-13 VITALS
HEIGHT: 58 IN | BODY MASS INDEX: 53.32 KG/M2 | DIASTOLIC BLOOD PRESSURE: 95 MMHG | WEIGHT: 254 LBS | SYSTOLIC BLOOD PRESSURE: 155 MMHG | HEART RATE: 96 BPM

## 2019-11-13 DIAGNOSIS — M79.672 FOOT PAIN, BILATERAL: ICD-10-CM

## 2019-11-13 DIAGNOSIS — E11.42 CONTROLLED TYPE 2 DIABETES MELLITUS WITH DIABETIC POLYNEUROPATHY, WITHOUT LONG-TERM CURRENT USE OF INSULIN (HCC): Primary | ICD-10-CM

## 2019-11-13 DIAGNOSIS — B35.1 ONYCHOMYCOSIS: ICD-10-CM

## 2019-11-13 DIAGNOSIS — M79.671 FOOT PAIN, BILATERAL: ICD-10-CM

## 2019-11-13 DIAGNOSIS — L85.3 XEROSIS OF SKIN: ICD-10-CM

## 2019-11-13 DIAGNOSIS — M72.2 PLANTAR FASCIITIS OF RIGHT FOOT: ICD-10-CM

## 2019-11-13 PROCEDURE — 99213 OFFICE O/P EST LOW 20 MIN: CPT | Performed by: STUDENT IN AN ORGANIZED HEALTH CARE EDUCATION/TRAINING PROGRAM

## 2019-11-13 PROCEDURE — 99212 OFFICE O/P EST SF 10 MIN: CPT | Performed by: STUDENT IN AN ORGANIZED HEALTH CARE EDUCATION/TRAINING PROGRAM

## 2019-11-13 PROCEDURE — 11721 DEBRIDE NAIL 6 OR MORE: CPT | Performed by: STUDENT IN AN ORGANIZED HEALTH CARE EDUCATION/TRAINING PROGRAM

## 2019-11-13 RX ORDER — UREA 200 MG/G
GEL TOPICAL
Qty: 1 CONTAINER | Refills: 2 | Status: SHIPPED | OUTPATIENT
Start: 2019-11-13 | End: 2020-07-01 | Stop reason: SDUPTHER

## 2019-11-13 RX ORDER — KETOCONAZOLE 20 MG/G
CREAM TOPICAL
Qty: 30 G | Refills: 1 | Status: SHIPPED | OUTPATIENT
Start: 2019-11-13

## 2019-11-14 ENCOUNTER — HOSPITAL ENCOUNTER (OUTPATIENT)
Dept: PHYSICAL THERAPY | Age: 64
Setting detail: THERAPIES SERIES
Discharge: HOME | End: 2019-11-14
Payer: MEDICARE

## 2019-11-22 DIAGNOSIS — I10 ESSENTIAL HYPERTENSION: ICD-10-CM

## 2019-11-22 RX ORDER — LISINOPRIL 40 MG/1
40 TABLET ORAL DAILY
Qty: 90 TABLET | Refills: 1 | Status: SHIPPED | OUTPATIENT
Start: 2019-11-22

## 2020-02-10 ENCOUNTER — OFFICE VISIT (OUTPATIENT)
Dept: ORTHOPEDIC SURGERY | Age: 65
End: 2020-02-10
Payer: MEDICARE

## 2020-02-10 VITALS — WEIGHT: 253.97 LBS | BODY MASS INDEX: 53.31 KG/M2 | HEIGHT: 58 IN

## 2020-02-10 PROCEDURE — 99213 OFFICE O/P EST LOW 20 MIN: CPT | Performed by: ORTHOPAEDIC SURGERY

## 2020-02-10 NOTE — PROGRESS NOTES
Sensations intact to light touch about the sural, saphenous, deep/superficial peroneal, and tibial nerve distributions. Neuro: alert. oriented  Eyes: Extra-ocular muscles intact  Mouth: Oral mucosa moist. No perioral lesions  Pulm: Respirations unlabored and regular. Skin: warm, well perfused  Psych:   Patient has good fund of knowledge and displays understanging of exam, diagnosis, and plan. Radiology:   None taken at clinic. Assessment:      1. Primary osteoarthritis of both knees       Plan:     I reviewed the physical exam findings with the patient I informed her that the painful right knee symptoms at the medial aspect is consistent with her x-ray findings of severe medial compartment arthritis. We discussed the etiologies and natural histories of osteoarthritis. We discussed in length the treatment alternatives including anti-inflammatory medications, physical therapy, injections, and as a last result surgery. Patient was recommended to continue her Mobic and tramadol therapy with her primary care as she finds significant relief. Regarding her physical therapy, aquatic therapy was recommended as an alternative to traditional therapy in hopes to decrease her physiologic burden towards strengthening her knees. As to her steroid injections, patient is still very apprehensive towards receiving an injection. After greater discussion, she states she would like to think on it for her next follow-up visit. During today's visit, a physical therapy prescription and a knee sleeve brace was administered. She should return to clinic in 4 months to follow-up with us and at that time discuss possible injection. She was amenable to all recommendations and was encouraged to call the office with any questions. Follow up:Return in about 4 months (around 6/10/2020). No orders of the defined types were placed in this encounter.        Orders Placed This Encounter   Procedures    External Referral To Physical Therapy     Referral Priority:   Routine     Referral Type:   Eval and Treat     Referral Reason:   Specialty Services Required     Requested Specialty:   Physical Therapy     Number of Visits Requested:   1       Electronically signed by Fawad Leiva DO  Orthopedic Surgery Resident, PGY-1  [address]    2/10/2020 at 3:42 PM

## 2020-02-28 NOTE — PROGRESS NOTES
A [id number] was ordered and placed on the patient for pain control and stabilization due to arthritis of the right knee. Patient is ambulatory with weakness and instability therefore a economy hinged knee brace will help with the weakness, stabilization and pain control. The brace will improve ADL's.

## 2020-03-16 ENCOUNTER — TELEPHONE (OUTPATIENT)
Dept: INTERNAL MEDICINE | Age: 65
End: 2020-03-16

## 2020-06-24 NOTE — PATIENT INSTRUCTIONS
Patient Education        Diabetes Foot Health: Care Instructions  Your Care Instructions     When you have diabetes, your feet need extra care and attention. Diabetes can damage the nerve endings and blood vessels in your feet, making you less likely to notice when your feet are injured. Diabetes also limits your body's ability to fight infection and get blood to areas that need it. If you get a minor foot injury, it could become an ulcer or a serious infection. With good foot care, you can prevent most of these problems. Caring for your feet can be quick and easy. Most of the care can be done when you are bathing or getting ready for bed. Follow-up care is a key part of your treatment and safety. Be sure to make and go to all appointments, and call your doctor if you are having problems. It's also a good idea to know your test results and keep a list of the medicines you take. How can you care for yourself at home? · Keep your blood sugar close to normal by watching what and how much you eat, monitoring blood sugar, taking medicines if prescribed, and getting regular exercise. · Do not smoke. Smoking affects blood flow and can make foot problems worse. If you need help quitting, talk to your doctor about stop-smoking programs and medicines. These can increase your chances of quitting for good. · Eat a diet that is low in fats. High fat intake can cause fat to build up in your blood vessels and decrease blood flow. · Inspect your feet daily for blisters, cuts, cracks, or sores. If you cannot see well, use a mirror or have someone help you. · Take care of your feet:  ? Wash your feet every day. Use warm (not hot) water. Check the water temperature with your wrists or other part of your body, not your feet. ? Dry your feet well. Pat them dry. Do not rub the skin on your feet too hard. Dry well between your toes.  If the skin on your feet stays moist, bacteria or a fungus can grow, which can lead to infection. ? Keep your skin soft. Use moisturizing skin cream to keep the skin on your feet soft and prevent calluses and cracks. But do not put the cream between your toes, and stop using any cream that causes a rash. ? Clean underneath your toenails carefully. Do not use a sharp object to clean underneath your toenails. Use the blunt end of a nail file or other rounded tool. ? Trim and file your toenails straight across to prevent ingrown toenails. Use a nail clipper, not scissors. Use an emery board to smooth the edges. · Change socks daily. Socks without seams are best, because seams often rub the feet. You can find socks for people with diabetes from specialty catalogs. · Look inside your shoes every day for things like gravel or torn linings, which could cause blisters or sores. · Buy shoes that fit well:  ? Look for shoes that have plenty of space around the toes. This helps prevent bunions and blisters. ? Try on shoes while wearing the kind of socks you will usually wear with the shoes. ? Avoid plastic shoes. They may rub your feet and cause blisters. Good shoes should be made of materials that are flexible and breathable, such as leather or cloth. ? Break in new shoes slowly by wearing them for no more than an hour a day for several days. Take extra time to check your feet for red areas, blisters, or other problems after you wear new shoes. · Do not go barefoot. Do not wear sandals, and do not wear shoes with very thin soles. Thin soles are easy to puncture. They also do not protect your feet from hot pavement or cold weather. · Have your doctor check your feet during each visit. If you have a foot problem, see your doctor. Do not try to treat an early foot problem at home. Home remedies or treatments that you can buy without a prescription (such as corn removers) can be harmful. · Always get early treatment for foot problems.  A minor irritation can lead to a major problem if not properly cared for early. When should you call for help? Call your doctor now or seek immediate medical care if:  · You have a foot sore, an ulcer or break in the skin that is not healing after 4 days, bleeding corns or calluses, or an ingrown toenail. · You have blue or black areas, which can mean bruising or blood flow problems. · You have peeling skin or tiny blisters between your toes or cracking or oozing of the skin. · You have a fever for more than 24 hours and a foot sore. · You have new numbness or tingling in your feet that does not go away after you move your feet or change positions. · You have unexplained or unusual swelling of the foot or ankle. Watch closely for changes in your health, and be sure to contact your doctor if:  · You cannot do proper foot care. Where can you learn more? Go to [email or web address] and sign in to your "Hey, Neighbor!" account. Enter A739 in the [employer] box to learn more about \"Diabetes Foot Health: Care Instructions. \"     If you do not have an account, please click on the \"Sign Up Now\" link. Current as of: December 20, 2019               Content Version: 12.5  © 0268-2378 Healthwise, Incorporated. Care instructions adapted under license by [facility] ([facility]). If you have questions about a medical condition or this instruction, always ask your healthcare professional. [place] [address] any warranty or liability for your use of this information.

## 2020-07-01 ENCOUNTER — OFFICE VISIT (OUTPATIENT)
Dept: PODIATRY | Age: 65
End: 2020-07-01
Payer: MEDICARE

## 2020-07-01 VITALS
DIASTOLIC BLOOD PRESSURE: 70 MMHG | BODY MASS INDEX: 53.22 KG/M2 | HEIGHT: 58 IN | TEMPERATURE: 98.2 F | SYSTOLIC BLOOD PRESSURE: 130 MMHG | HEART RATE: 68 BPM | WEIGHT: 253.53 LBS

## 2020-07-01 PROCEDURE — 99213 OFFICE O/P EST LOW 20 MIN: CPT | Performed by: STUDENT IN AN ORGANIZED HEALTH CARE EDUCATION/TRAINING PROGRAM

## 2020-07-01 PROCEDURE — 99212 OFFICE O/P EST SF 10 MIN: CPT | Performed by: PODIATRIST

## 2020-07-01 RX ORDER — UREA 200 MG/G
GEL TOPICAL
Qty: 1 CONTAINER | Refills: 5 | Status: SHIPPED | OUTPATIENT
Start: 2020-07-01 | End: 2020-07-01

## 2020-07-01 RX ORDER — AMMONIUM LACTATE 12 G/100G
LOTION TOPICAL
Qty: 1 BOTTLE | Refills: 5 | Status: SHIPPED | OUTPATIENT
Start: 2020-07-01

## 2020-07-01 RX ORDER — KETOCONAZOLE 20 MG/G
CREAM TOPICAL
Qty: 30 G | Refills: 5 | Status: CANCELLED | OUTPATIENT
Start: 2020-07-01

## 2020-07-01 RX ORDER — AMMONIUM LACTATE 12 G/100G
LOTION TOPICAL
Qty: 1 BOTTLE | Refills: 5 | Status: SHIPPED | OUTPATIENT
Start: 2020-07-01 | End: 2020-07-01

## 2020-07-01 NOTE — PROGRESS NOTES
>3 months ago - order Fasting lipids and refer to PCP for follow up   []  If LDL < 100 and over 1 year ago - Order Fasting lipids and refer to PCP for follow up   [] LDL is controlled. LDL < 100 and checked within the last year     Blood Pressure  BP Readings from Last 3 Encounters:   11/13/19 (!) 155/95   08/21/19 131/71   06/24/19 139/86      []  If SBP >140 mmhg - refer to PCP for follow up   []  If DBP > 90 mmhg - refer to PCP for follow up   [] BP is controlled <140/90     Order labs as PCP ordered.   (ie: Lipids, A1C, CMP)

## 2020-07-01 NOTE — PROGRESS NOTES
Subjective:   HPI: Patient is a 76year old diabetic female who presents for routine DM foot care and complaints of itchy, dry feet. Patient denies wounds or signs of infection. Patient is also asking for a referrel for a store that will sell good, comfortable sandals. Patient reports some mild, non painful tingling at the end of the day in both of her feet. PCP is Zander Allen MD    Objective:   Physical Exam   Vitals:    07/01/20 1359   BP: 130/70   Pulse: 68   Temp: 98.2 °F (36.8 °C)        Lab Results   Component Value Date    LABA1C 6.3 12/13/2018       Gen: AAOx3, NAD     Vascular:   DP/PT pulses are palpable, bilateral.  CFT is brisk to all digits. Skin temp is warm to warm proximal to distal, bilateral.   edema noted to bilateral ankles. No varicosities noted, bilateral.      Neurologic:  Epicritic sensation intact, Bilateral. Reports of some tingling at night.     Dermatologic: Skin is noted to be warm dry and xerotic plantarly and dorsally. Xerosis and scaling is noted to be worse in a moccasin distribution pattern. Toenails 1-5 are trimmed and clean b/l  No open wounds or interdigital maceration 1 through 4 bilateral.   No hyperkeratotic tissue noted. Musculoskeletal: Muscle strength 5/5 for all LE muscle groups. Brachymetatarsal noted to right 4th. Patient denies any pain or complaints about this digit. No TTP of plantar medial tubercle of right calcaneus and central band of plantar fascia. Assessment:   Pt is a 76 y.o. female with      Diagnosis Orders   1. Type 2 diabetes mellitus without complication, without long-term current use of insulin ([facility])   DIABETES FOOT EXAM   2. Controlled type 2 diabetes mellitus with diabetic polyneuropathy, without long-term current use of insulin ([facility])  ketoconazole (NIZORAL) 2 % cream    urea (CARMOL) 20 % cream   3.  Xerosis of skin  ammonium lactate (LAC-HYDRIN) 12 % lotion    ketoconazole (NIZORAL) 2 % cream    urea (CARMOL) 20 % cream       Plan:      · Patient seen and evaluated  · Condition & treatment options discussed in detail w/ pt  · Rx for Lac-Hydrin. Patient to apply daily  · Rx for ciclopirox cream. Patient to apply to plantar foot 2 times daily. · Continue orthotics & supportive shoegear  · Continue compression stockings for BLE edema  · Educated patient on importance of tight glycemic control, daily foot checks/hygiene, monitor for signs of infection & no barefoot ambulation  · RTC in 3 months for follow-up of tinea pedis. No orders of the defined types were placed in this encounter.

## 2020-07-09 ENCOUNTER — OFFICE VISIT (OUTPATIENT)
Dept: ORTHOPEDIC SURGERY | Age: 65
End: 2020-07-09
Payer: MEDICARE

## 2020-07-09 VITALS — WEIGHT: 253.53 LBS | BODY MASS INDEX: 53.22 KG/M2 | HEIGHT: 58 IN

## 2020-07-09 PROCEDURE — 99213 OFFICE O/P EST LOW 20 MIN: CPT | Performed by: STUDENT IN AN ORGANIZED HEALTH CARE EDUCATION/TRAINING PROGRAM

## 2020-07-09 NOTE — PROGRESS NOTES
[facility]  [facility]  [address] [place] [address]  Dept: [phone]  Dept Fax: [phone]        Ambulatory Follow-Up    Subjective:   Cristiane Parks is a 76y.o. year old female who presents to our office today for routine followup regarding:   her   1. Primary osteoarthritis of both knees    2. Fall, initial encounter    . Chief Complaint   Patient presents with    Knee Pain     bilateral       HPI  Ms. Judy Thompson is a 77-year-old -American female has been following in the clinic for some time regarding bilateral knee pain due to significant osteoarthritis. He was last seen in clinic regarding her bilateral knee pain on 2/10/2020 at which time she was doing okay on Mobic and tramadol per her primary care physician. She now presents for delayed follow-up due to issues with clinic presentation during the COVID-19 pandemic. She did not proceed with physical therapy recommended at last appointment as prior physical therapy did not help her. She also refused steroid injections at last visit. She was given prescriptions for a knee sleeve brace as well as a walker, but requests a new prescription for a walker as she lost the last one. She continues to endorse constant dull aching pain to both knees, right worse than left, except for a couple of weeks ago when she sustained a fall onto her left knee. She did not present for evaluation at the time of the fall and noted some increased pain and swelling that has since resolved. Denies any significant changes in gait, weakness, or numbness or tingling in extremities. Review of Systems   Constitutional: Negative for fever and chills. HENT: Negative for congestion. Eyes: Negative for blurred vision and double vision. Respiratory: Negative for cough, shortness of breath and wheezing. Cardiovascular: Negative for chest pain and palpitations. Gastrointestinal: Negative for nausea.  Negative for vomiting. Musculoskeletal: Positive for pain and occasional swelling to both knees (right worse than left). Skin: Negative for itching and rash. Neurological: Negative for dizziness, sensory change and headaches. Psychiatric/Behavioral: Negative for depression and suicidal ideas. I have reviewed the CC, HPI, ROS, PMH, FHX, Social History. I agree with the documentation provided by other staff, residents, and/or medical students and have reviewed their documentation prior to providing my signature indicating agreement. Objective :   Ht 4' 9.99\" (1.473 m)   Wt 253 lb 8.5 oz (115 kg)   BMI 53.00 kg/m²   General: AAOx3, NAD, appears stated age  Right Knee Exam     Tenderness   The patient is experiencing tenderness in the medial joint line and patella. Range of Motion   Extension: 0   Flexion: 90     Tests   Varus: negative Valgus: negative    Other   Erythema: absent  Scars: absent  Sensation: normal (sensation intact to light touch about knee and distally)  Pulse: present (2+ popliteal; leg warm and perfused)  Swelling: none  Effusion: no effusion present      Left Knee Exam     Tenderness   The patient is experiencing tenderness in the medial joint line and patella. Range of Motion   Extension: 0   Flexion: 100     Tests   Varus: negative Valgus: negative    Other   Erythema: absent  Scars: absent  Sensation: normal (sensation intact to light touch about knee and distally)  Pulse: present (2+ popliteal; leg warm and perfused)  Swelling: none  Effusion: effusion (trace/1+) present        CV: no obvious JVD, no dependent edema, distal pulses 2+  Respiratory: chest rise symmetric, unlabored respirations, no audible wheezing  Skin: warm, well perfused, no obvious rashes or lesions  Psych: patient displays understanding of exam, diagnosis, and plan. Radiology:   History:   Severe left knee pain with baseline DJD, recent fall onto left knee    Findings:   Views: 4V Left Knee   Weight bearing:  Yes Findings: AP, lateral, notch, and Merchant views of the left knee obtained in office today reviewed and remarkable for severe tricompartmental degenerative changes with varus malalignment. No findings of acute fracture, dislocation/subluxation, or radio-opaque foreign bodies. Previous comparison films: September 30, 2019. Impression:  Kellgren-Claude grade 4 left knee OA    Assessment:      1. Primary osteoarthritis of both knees    2. Fall, initial encounter       Plan:      -Reviewed diagnoses, x-ray findings, and physical exam findings with the patient in office today. Left knee x-rays were obtained due to the patient's recent history of a fall and no prior formal evaluation after this injury. X-rays were reviewed with the patient for demonstration of persistent severe tricompartmental arthritis but no findings of any acute injury.  -Regarding her bilateral knee osteoarthritis, patient again refused corticosteroid or Visco supplementation injections. Prescription for a wheeled walker with a seat was provided.  -Encouraged patient to continue by mouth anti-inflammatories, home therapy exercises to maintain strength and motion, and improved healthy lifestyle choices for weight loss so that if a total knee arthroplasty is desired in the future BMI will be more optimized. -Follow-up as needed if reconsiders corticosteroid injections. Follow up:Return in about 3 months (around 10/9/2020). Orders Placed This Encounter   Medications    Misc.  Devices MISC     Sig: Adjustable rolling knee scooter with seated bench     Dispense:  1 Device     Refill:  0        Orders Placed This Encounter   Procedures    XR KNEE LEFT (MIN 4 VIEWS)     Standing Status:   Future     Number of Occurrences:   1     Standing Expiration Date:   7/9/2021     Order Specific Question:   Reason for exam:     Answer:   recent fall, increased left knee pain

## 2021-01-13 DIAGNOSIS — M25.561 RIGHT KNEE PAIN, UNSPECIFIED CHRONICITY: Primary | ICD-10-CM

## 2021-01-14 ENCOUNTER — OFFICE VISIT (OUTPATIENT)
Dept: ORTHOPEDIC SURGERY | Age: 66
End: 2021-01-14
Payer: MEDICARE

## 2021-01-14 VITALS — BODY MASS INDEX: 57.3 KG/M2 | WEIGHT: 273 LBS | HEIGHT: 58 IN

## 2021-01-14 DIAGNOSIS — M17.0 PRIMARY OSTEOARTHRITIS OF BOTH KNEES: Primary | ICD-10-CM

## 2021-01-14 PROCEDURE — 99213 OFFICE O/P EST LOW 20 MIN: CPT | Performed by: STUDENT IN AN ORGANIZED HEALTH CARE EDUCATION/TRAINING PROGRAM

## 2021-01-14 RX ORDER — METHYLPREDNISOLONE 4 MG/1
TABLET ORAL
Qty: 1 KIT | Refills: 0 | Status: SHIPPED | OUTPATIENT
Start: 2021-01-14 | End: 2021-01-20

## 2021-01-15 NOTE — PROGRESS NOTES
[facility]  [facility]  [address] [address]  Dept: [phone]  Dept Fax: [phone]        Ambulatory Follow Up    Subjective:   Jocelyn Winters is a 76y.o. year old female who presents to our office today for routine followup regarding:   her   1. Primary osteoarthritis of both knees      Chief Complaint   Patient presents with    Follow-up     bilateral knee pain     HPI  Ms. Hever Suh clinic today for follow-up regarding her chronic bilateral knee pain. She states that she continues to have essentially constant aching throbbing pain to both knees that is worse with prolonged ambulation and particularly going up stairs. She denies any interval falls or other injuries since her prior clinic visit on 7/9/2020. At that visit patient had sustained a fall to the left knee but x-rays were negative for any findings of fracture. She was also provided with a prescription for a wheeled walker to help with ambulation. She refused corticosteroid injections at that time as well. Patient continues to take Mobic, but states that she feels it is no longer helping like it used to and asks if there are any additional medications we could recommend today. Patient denies any other complaints. Patient denies any numbness or tingling to either leg. Review of Systems   Constitutional: Negative for fever and chills. HENT: Negative for congestion. Eyes: Negative for blurred vision and double vision. Respiratory: Negative for cough, shortness of breath and wheezing. Cardiovascular: Negative for chest pain and palpitations. Gastrointestinal: Negative for nausea. Negative for vomiting. Musculoskeletal: Positive for myalgias and pain in both knees with limited motion. Skin: Negative for itching and rash. Neurological: Negative for dizziness, sensory change and headaches. Psychiatric/Behavioral: Negative for depression and suicidal ideas.      I have reviewed the CC, HPI, ROS, PMH, FHX, Social History. I agree with the documentation provided by other staff, residents, and/or medical students and have reviewed their documentation prior to providing my signature indicating agreement. Objective :   Ht 4' 10\" (1.473 m)   Wt 273 lb (123.8 kg)   BMI 57.06 kg/m²   General: AAOx3, NAD, appears stated age  Right Knee Exam     Tenderness   The patient is experiencing tenderness in the patella and medial joint line. Range of Motion   Extension: -5   Flexion: 90     Tests   Varus: negative Valgus: negative  Lachman:  Anterior - 1+      Drawer:  Posterior - negative    Other   Erythema: absent  Scars: absent  Sensation: normal (Sensation intact to light touch about knee and distally)  Pulse: present (2+ popliteal)  Swelling: mild  Effusion: effusion (1+) present      Left Knee Exam     Tenderness   The patient is experiencing tenderness in the patella and medial joint line. Range of Motion   Extension: -5   Flexion: 100     Tests   Varus: negative Valgus: negative  Lachman:  Anterior - 1+      Drawer:  Posterior - negative    Other   Erythema: absent  Scars: absent  Sensation: normal (Sensation intact to light touch about knee and distally)  Pulse: present (2+ popliteal)  Swelling: mild  Effusion: effusion (trace) present        CV: no obvious JVD, no dependent edema, distal pulses 2+  Respiratory: chest rise symmetric, unlabored respirations, no audible wheezing  Skin: warm, well perfused, no obvious rashes or lesions  Psych: patient displays understanding of exam, diagnosis, and plan.     Radiology:   History:   Chronic bilateral knee pain    Findings:   Views: 4V  Right Knee   Weight bearing: Yes   Findings: AP, lateral, notch, and merchant views of the right knee obtained in office today reviewed and remarkable for severe tricompartmental degenerative changes most significantly affecting the medial tibiofemoral compartment with significant varus malalignment and mild lateral tibial subluxation. Severe loss of joint space and tricompartmental changes of subchondral cystic and sclerotic changes with significant osteophytosis. No findings of any acute fracture, dislocation, or radiopaque foreign bodies noted. Previous comparison films: September 30, 2019    Impression:  Grade 4 Kellgren-Claude right knee OA    Assessment:      1. Primary osteoarthritis of both knees       Plan:      -Reviewed diagnoses and x-ray findings are great length with the patient in office today. Reviewed that given the severity of her degenerative changes to both knees she would likely benefit from interventions of total knee arthroplasty, however at this time there would be significant medical optimization prior to consideration of surgery most importantly would be a reduction in BMI. Encourage patient to continue healthy lifestyle choices and attempts at weight loss. Patient states that she is trying and has lost some weight. -Reviewed options regarding R knee OA. She continues to take Mobic, but feels it is no longer helping and requests a different medication. Reviewed several options with the patient and will discontinue Mobic and start Voltaren. Prescription provided.  -Again discussed corticosteroid and/or HA viscosupplementation injections. Patient again refused. Reviewed recent labs in the EMR patient's last hemoglobin A1c was 6.3. Discussed oral steroid treatment with a Medrol Dosepak and patient is in agreement. Reviewed with patient side effects particularly potential elevation of blood glucose. -Follow-up in 2 to 3 months pending response to corticosteroid treatment. Call in the interim with any significant changes, questions, or concerns. Follow up:Return in about 3 months (around 4/14/2021).     Orders Placed This Encounter   Medications    diclofenac (VOLTAREN) 50 MG EC tablet     Sig: Take 1 tablet by mouth 3 times daily (with meals)     Dispense:  60 tablet Refill:  3    methylPREDNISolone (MEDROL DOSEPACK) 4 MG tablet     Sig: Take by mouth. Dispense:  1 kit     Refill:  0        No orders of the defined types were placed in this encounter.

## 2021-02-17 ENCOUNTER — TELEPHONE (OUTPATIENT)
Dept: ORTHOPEDIC SURGERY | Age: 66
End: 2021-02-17

## 2021-03-04 ENCOUNTER — OFFICE VISIT (OUTPATIENT)
Dept: ORTHOPEDIC SURGERY | Age: 66
End: 2021-03-04
Payer: MEDICARE

## 2021-03-04 VITALS — BODY MASS INDEX: 57.05 KG/M2 | HEIGHT: 58 IN | WEIGHT: 271.8 LBS

## 2021-03-04 DIAGNOSIS — M25.561 RIGHT KNEE PAIN, UNSPECIFIED CHRONICITY: ICD-10-CM

## 2021-03-04 DIAGNOSIS — M17.0 PRIMARY OSTEOARTHRITIS OF BOTH KNEES: Primary | ICD-10-CM

## 2021-03-04 PROCEDURE — 99212 OFFICE O/P EST SF 10 MIN: CPT | Performed by: STUDENT IN AN ORGANIZED HEALTH CARE EDUCATION/TRAINING PROGRAM

## 2021-03-04 NOTE — PROGRESS NOTES
[facility]  [facility]  [address] [address]  Dept: [phone]  Dept Fax: [phone]        Ambulatory Follow Up    Subjective:   Josh Macdonald is a 76y.o. year old female who presents to our office today for routine followup regarding right knee pain. Patient's been seen in our office multiple times for right-sided knee pain due to osteoarthritis. She was last seen in our office on 1/15/2021, where conversations were had about corticosteroid injections, and total knee arthroplasty. At this time, a prescription for Voltaren and a Medrol Dosepak was provided. Patient states that the Voltaren does take the edge off her pain. Additionally, she reports that the C/ Flak[address] Teresa 19 did help her pain significantly, however she wishes it was stronger. Patient was scheduled for 3-month follow-up to discuss her knee pain. At this visit, she once again states that she is not interested in receiving corticosteroid injections or discussing surgery at this time. She states that although she has pain, she is able to manage it at this time appropriately with the medication she has. 1. Primary osteoarthritis of both knees    2. Right knee pain, unspecified chronicity    . Chief Complaint   Patient presents with    Knee Pain     bilateral        HPI    Review of Systems   Constitutional: Negative for fever and chills. HENT: Negative for congestion. Eyes: Negative for blurred vision and double vision. Respiratory: Negative for cough, shortness of breath and wheezing. Cardiovascular: Negative for chest pain and palpitations. Gastrointestinal: Negative for nausea. Negative for vomiting. Musculoskeletal: Positive for right knee pain  Skin: Negative for itching and rash. Neurological: Negative for dizziness, sensory change and headaches. Psychiatric/Behavioral: Negative for depression and suicidal ideas.        Objective :   General: AAOx3, NAD, Surgery Resident, PGY-1  [facility]

## 2022-02-23 ENCOUNTER — OFFICE VISIT (OUTPATIENT)
Dept: ORTHOPEDIC SURGERY | Age: 67
End: 2022-02-23
Payer: MEDICARE

## 2022-02-23 VITALS — HEIGHT: 58 IN | BODY MASS INDEX: 59.41 KG/M2 | WEIGHT: 283 LBS

## 2022-02-23 DIAGNOSIS — M17.0 PRIMARY OSTEOARTHRITIS OF BOTH KNEES: Primary | ICD-10-CM

## 2022-02-23 PROCEDURE — 99214 OFFICE O/P EST MOD 30 MIN: CPT | Performed by: PHYSICIAN ASSISTANT

## 2022-02-23 NOTE — PROGRESS NOTES
[address]  [address]  Dept: [phone]  Dept Fax: [phone]        Ambulatory Follow Up      Subjective:   Tori Bunn is a 78y.o. year old female who presents to our office today for routine followup regarding her   1. Primary osteoarthritis of both knees        Chief Complaint   Patient presents with    Follow-up     B knee pain          HPI Tori Bunn  is a 78 y.o. female who presents today in follow for bilateral knee pain. The patient was last seen on 3/4/2021 and underwent treatment in the form of continue Diclofenac 75mg BID and continue activity as tolerated. The patient notes she is not interested in knee injections because she is needle phobic and she is also not interested in surgical intervention for her bilateral knee pain. The patient notes that she would like to restart physical therapy for help with strengthening, but she does not want to start until the spring. BMI 59.15 (283lbs). Type 2 diabetic , no updated Hgb A1c in chart. Former smoker - quit in 2007 (7.5 pack year history). Review of Systems   Constitutional: Negative for activity change and fever. HENT: Negative for sneezing. Respiratory: Negative for cough and shortness of breath. Cardiovascular: Negative for chest pain. Gastrointestinal: Negative for vomiting. Musculoskeletal: Positive for arthralgias (bilateral knee). Negative for joint swelling and myalgias. Skin: Negative for color change. Neurological: Negative for weakness and numbness. Psychiatric/Behavioral: Negative for sleep disturbance. Objective :   Ht 4' 10\" (1.473 m)   Wt 283 lb (128.4 kg)   BMI 59.15 kg/m²  Body mass index is 59.15 kg/m². General: Tori Bunn is a 78 y.o. female who is alert and oriented and sitting comfortably in our office.   Ortho Exam  MS:  Evaluation of the Bilateral knee reveals a significant varus deformity on the bilateral knee. There is no erythema, skin warmth, skin lesions, or signs of infection appreciated. There is tenderness over the lateral joint line with palpation, bilaterally. There is a no appreciable knee effusion noted bilaterally. Range of motion of the Bilateral knee is 8-85. No gross instability of the knee is appreciated at 0 and 30° of flexion, bilaterally. There is a negative anterior drawer and Lachman's test, bilaterally. There is increased pain with lateral  Melvin's testing, bilaterally. No calf tenderness is noted, bilaterally. There is a negative hip log roll and Stinchfield test on the Bilateral hip. Motor, sensory, vascular examination to the Bilateral lower extremity is intact. Patient has full range of motion of the Bilateral ankle. Neuro: alert and oriented to person and place. Eyes: Extra-ocular muscles intact  Mouth: Oral mucosa moist. No perioral lesions  Pulm: Respirations unlabored and regular. Symmetric chest excursion without outward deformity is noted. Skin: warm, well perfused  Psych:   Patient has good fund of knowledge and displays understanging of exam, diagnosis, and plan. Radiology:  XR KNEE LEFT (MIN 4 VIEWS)    Result Date: 2/23/2022  History:  Chronic Left knee pain. Comparison: 1/14/2021. Findings:   Standing AP/Lateral/Tunnel/Merchant view xrays of the Left knee done in the office today shows severe  medial joint space narrowing, tricompartmental osteophytosis, joint line sclerosis medially. There is significant varus malalignment and moderate lateral tibial subluxation appreciated. Subchondral cystic changes and sclerotic changes noted on the proximal tibia and distal femur. No acute evidence of fracture, subluxation, dislocation, radioopaque foreign body/tumor is noted. Lateral subluxation of the tibia is appreciated. No significant change when compared to images from 1/14/2021.  Impression:   Left knee severe  degenerative changes as described above. XR KNEE RIGHT (MIN 4 VIEWS)    Result Date: 2/23/2022  History:   Chronic Right knee pain. Comparison: 1/14/2021. Findings:   Standing AP/Lateral/Tunnel/Merchant view xrays of the Right knee done in the office today shows severe  medial joint space narrowing, tricompartmental osteophytosis, joint line sclerosis medially. There is significant varus malalignment in moderate lateral tibial subluxation appreciated. Subchondral cystic changes and sclerotic changes noted on the proximal tibia and distal femur. No acute evidence of fracture, subluxation, dislocation, radioopaque foreign body/tumor is noted. No significant change when compared to images from 1/14/2021. Impression:  Right knee severe  degenerative changes as described above. Assessment:      1. Primary osteoarthritis of both knees       Plan: Today in office we discussed etiology and natural history of chronic bilateral knee pain. I personally reviewed the patient's x-rays from today revealing severe degenerative changes within the bilateral knee with significant varus deformity appreciated. The treatment options may include activity modification, oral anti-inflammatories, bracing, injections, advanced imaging, physical therapy and/or surgical intervention. The patient notes she does not want injections or surgery at this time. The patient would like to proceed with:  1. A prescription for Topical voltaren gel. 2. Continue activity as tolerated and use a cane or walker for ambulatory support. 3. Call when ready for a outpatient PT referral ([facility])    The patient will follow up as needed for her severe bilateral knee arthritis. We discussed that the patient should call us with any questions or concerns. The patient voiced her understanding. Follow up:Return if symptoms worsen or fail to improve.     Orders Placed This Encounter   Medications    diclofenac sodium (VOLTAREN) 1 % GEL     Sig: Apply 2 g topically 4 times daily     Dispense:  350 g     Refill:  1         Orders Placed This Encounter   Procedures    XR KNEE RIGHT (MIN 4 VIEWS)     Standing Status:   Future     Number of Occurrences:   1     Standing Expiration Date:   2/23/2023     Order Specific Question:   Reason for exam:     Answer:   pain    XR KNEE LEFT (MIN 4 VIEWS)     Standing Status:   Future     Number of Occurrences:   1     Standing Expiration Date:   2/23/2023     Order Specific Question:   Reason for exam:     Answer:   pain       This note is created with the assistance of a speech recognition program.  While intending to generate a document that actually reflects the content of the visit, the document can still have some errors including those of syntax and sound a like substitutions which may escape proof reading. In such instances, actual meaning can be extrapolated by contextual diversion.      Electronically signed by Virgil Holguin PA-C on 2/24/2022 at 8:22 PM

## 2022-02-24 ASSESSMENT — ENCOUNTER SYMPTOMS
COLOR CHANGE: 0
COUGH: 0
VOMITING: 0
SHORTNESS OF BREATH: 0

## 2022-05-11 DIAGNOSIS — M17.0 PRIMARY OSTEOARTHRITIS OF BOTH KNEES: ICD-10-CM
